# Patient Record
Sex: MALE | Race: WHITE | NOT HISPANIC OR LATINO | Employment: OTHER | URBAN - METROPOLITAN AREA
[De-identification: names, ages, dates, MRNs, and addresses within clinical notes are randomized per-mention and may not be internally consistent; named-entity substitution may affect disease eponyms.]

---

## 2024-05-13 ENCOUNTER — APPOINTMENT (OUTPATIENT)
Dept: CARDIOLOGY | Facility: HOSPITAL | Age: 84
DRG: 153 | End: 2024-05-13
Payer: MEDICARE

## 2024-05-13 ENCOUNTER — HOSPITAL ENCOUNTER (INPATIENT)
Facility: HOSPITAL | Age: 84
LOS: 4 days | Discharge: HOME HEALTH | DRG: 153 | End: 2024-05-17
Attending: STUDENT IN AN ORGANIZED HEALTH CARE EDUCATION/TRAINING PROGRAM | Admitting: INTERNAL MEDICINE
Payer: MEDICARE

## 2024-05-13 ENCOUNTER — APPOINTMENT (OUTPATIENT)
Dept: RADIOLOGY | Facility: HOSPITAL | Age: 84
DRG: 153 | End: 2024-05-13
Payer: MEDICARE

## 2024-05-13 DIAGNOSIS — I67.89 VERTIGO DUE TO ACUTE CEREBROVASCULAR DISEASE: ICD-10-CM

## 2024-05-13 DIAGNOSIS — R42 VERTIGO DUE TO ACUTE CEREBROVASCULAR DISEASE: ICD-10-CM

## 2024-05-13 DIAGNOSIS — I65.21 STENOSIS OF RIGHT CAROTID ARTERY: ICD-10-CM

## 2024-05-13 DIAGNOSIS — G45.8 OTHER TRANSIENT CEREBRAL ISCHEMIC ATTACKS AND RELATED SYNDROMES: ICD-10-CM

## 2024-05-13 DIAGNOSIS — R42 DIZZINESS: Primary | ICD-10-CM

## 2024-05-13 LAB
ALBUMIN SERPL-MCNC: 4.2 G/DL (ref 3.5–5)
ALP BLD-CCNC: 70 U/L (ref 35–125)
ALT SERPL-CCNC: 22 U/L (ref 5–40)
ANION GAP SERPL CALC-SCNC: 14 MMOL/L
APPEARANCE UR: CLEAR
AST SERPL-CCNC: 26 U/L (ref 5–40)
BASOPHILS # BLD AUTO: 0.07 X10*3/UL (ref 0–0.1)
BASOPHILS NFR BLD AUTO: 0.8 %
BILIRUB SERPL-MCNC: 0.6 MG/DL (ref 0.1–1.2)
BILIRUB UR STRIP.AUTO-MCNC: NEGATIVE MG/DL
BUN SERPL-MCNC: 23 MG/DL (ref 8–25)
CALCIUM SERPL-MCNC: 9.2 MG/DL (ref 8.5–10.4)
CHLORIDE SERPL-SCNC: 106 MMOL/L (ref 97–107)
CO2 SERPL-SCNC: 21 MMOL/L (ref 24–31)
COLOR UR: NORMAL
CREAT SERPL-MCNC: 1 MG/DL (ref 0.4–1.6)
EGFRCR SERPLBLD CKD-EPI 2021: 75 ML/MIN/1.73M*2
EOSINOPHIL # BLD AUTO: 0.21 X10*3/UL (ref 0–0.4)
EOSINOPHIL NFR BLD AUTO: 2.4 %
ERYTHROCYTE [DISTWIDTH] IN BLOOD BY AUTOMATED COUNT: 12.8 % (ref 11.5–14.5)
GLUCOSE BLD MANUAL STRIP-MCNC: 94 MG/DL (ref 74–99)
GLUCOSE SERPL-MCNC: 167 MG/DL (ref 65–99)
GLUCOSE UR STRIP.AUTO-MCNC: NORMAL MG/DL
HCT VFR BLD AUTO: 39.6 % (ref 41–52)
HGB BLD-MCNC: 13.1 G/DL (ref 13.5–17.5)
HOLD SPECIMEN: NORMAL
IMM GRANULOCYTES # BLD AUTO: 0.02 X10*3/UL (ref 0–0.5)
IMM GRANULOCYTES NFR BLD AUTO: 0.2 % (ref 0–0.9)
KETONES UR STRIP.AUTO-MCNC: NEGATIVE MG/DL
LEUKOCYTE ESTERASE UR QL STRIP.AUTO: NEGATIVE
LYMPHOCYTES # BLD AUTO: 2.14 X10*3/UL (ref 0.8–3)
LYMPHOCYTES NFR BLD AUTO: 24.5 %
MAGNESIUM SERPL-MCNC: 2.2 MG/DL (ref 1.6–3.1)
MCH RBC QN AUTO: 32.2 PG (ref 26–34)
MCHC RBC AUTO-ENTMCNC: 33.1 G/DL (ref 32–36)
MCV RBC AUTO: 97 FL (ref 80–100)
MONOCYTES # BLD AUTO: 0.74 X10*3/UL (ref 0.05–0.8)
MONOCYTES NFR BLD AUTO: 8.5 %
NEUTROPHILS # BLD AUTO: 5.57 X10*3/UL (ref 1.6–5.5)
NEUTROPHILS NFR BLD AUTO: 63.6 %
NITRITE UR QL STRIP.AUTO: NEGATIVE
NRBC BLD-RTO: 0 /100 WBCS (ref 0–0)
PH UR STRIP.AUTO: 5.5 [PH]
PLATELET # BLD AUTO: 232 X10*3/UL (ref 150–450)
POTASSIUM SERPL-SCNC: 4.3 MMOL/L (ref 3.4–5.1)
PROT SERPL-MCNC: 7.3 G/DL (ref 5.9–7.9)
PROT UR STRIP.AUTO-MCNC: NEGATIVE MG/DL
RBC # BLD AUTO: 4.07 X10*6/UL (ref 4.5–5.9)
RBC # UR STRIP.AUTO: NEGATIVE /UL
SODIUM SERPL-SCNC: 141 MMOL/L (ref 133–145)
SP GR UR STRIP.AUTO: 1.02
TROPONIN T SERPL-MCNC: 21 NG/L
TROPONIN T SERPL-MCNC: 23 NG/L
UROBILINOGEN UR STRIP.AUTO-MCNC: NORMAL MG/DL
WBC # BLD AUTO: 8.8 X10*3/UL (ref 4.4–11.3)

## 2024-05-13 PROCEDURE — 70544 MR ANGIOGRAPHY HEAD W/O DYE: CPT

## 2024-05-13 PROCEDURE — 70551 MRI BRAIN STEM W/O DYE: CPT

## 2024-05-13 PROCEDURE — 81003 URINALYSIS AUTO W/O SCOPE: CPT

## 2024-05-13 PROCEDURE — 70544 MR ANGIOGRAPHY HEAD W/O DYE: CPT | Performed by: STUDENT IN AN ORGANIZED HEALTH CARE EDUCATION/TRAINING PROGRAM

## 2024-05-13 PROCEDURE — 82947 ASSAY GLUCOSE BLOOD QUANT: CPT

## 2024-05-13 PROCEDURE — 85025 COMPLETE CBC W/AUTO DIFF WBC: CPT | Performed by: STUDENT IN AN ORGANIZED HEALTH CARE EDUCATION/TRAINING PROGRAM

## 2024-05-13 PROCEDURE — 93005 ELECTROCARDIOGRAM TRACING: CPT

## 2024-05-13 PROCEDURE — 2500000001 HC RX 250 WO HCPCS SELF ADMINISTERED DRUGS (ALT 637 FOR MEDICARE OP)

## 2024-05-13 PROCEDURE — 99285 EMERGENCY DEPT VISIT HI MDM: CPT | Mod: 25

## 2024-05-13 PROCEDURE — 2500000004 HC RX 250 GENERAL PHARMACY W/ HCPCS (ALT 636 FOR OP/ED)

## 2024-05-13 PROCEDURE — 70450 CT HEAD/BRAIN W/O DYE: CPT

## 2024-05-13 PROCEDURE — 96374 THER/PROPH/DIAG INJ IV PUSH: CPT

## 2024-05-13 PROCEDURE — 70547 MR ANGIOGRAPHY NECK W/O DYE: CPT | Performed by: STUDENT IN AN ORGANIZED HEALTH CARE EDUCATION/TRAINING PROGRAM

## 2024-05-13 PROCEDURE — 83735 ASSAY OF MAGNESIUM: CPT | Performed by: STUDENT IN AN ORGANIZED HEALTH CARE EDUCATION/TRAINING PROGRAM

## 2024-05-13 PROCEDURE — 2060000001 HC INTERMEDIATE ICU ROOM DAILY

## 2024-05-13 PROCEDURE — 84484 ASSAY OF TROPONIN QUANT: CPT | Performed by: STUDENT IN AN ORGANIZED HEALTH CARE EDUCATION/TRAINING PROGRAM

## 2024-05-13 PROCEDURE — 70547 MR ANGIOGRAPHY NECK W/O DYE: CPT

## 2024-05-13 PROCEDURE — 2500000004 HC RX 250 GENERAL PHARMACY W/ HCPCS (ALT 636 FOR OP/ED): Performed by: NURSE PRACTITIONER

## 2024-05-13 PROCEDURE — 70551 MRI BRAIN STEM W/O DYE: CPT | Performed by: STUDENT IN AN ORGANIZED HEALTH CARE EDUCATION/TRAINING PROGRAM

## 2024-05-13 PROCEDURE — 36415 COLL VENOUS BLD VENIPUNCTURE: CPT | Performed by: STUDENT IN AN ORGANIZED HEALTH CARE EDUCATION/TRAINING PROGRAM

## 2024-05-13 PROCEDURE — 96361 HYDRATE IV INFUSION ADD-ON: CPT

## 2024-05-13 PROCEDURE — 84075 ASSAY ALKALINE PHOSPHATASE: CPT | Performed by: STUDENT IN AN ORGANIZED HEALTH CARE EDUCATION/TRAINING PROGRAM

## 2024-05-13 PROCEDURE — 2500000001 HC RX 250 WO HCPCS SELF ADMINISTERED DRUGS (ALT 637 FOR MEDICARE OP): Performed by: NURSE PRACTITIONER

## 2024-05-13 PROCEDURE — 70450 CT HEAD/BRAIN W/O DYE: CPT | Performed by: RADIOLOGY

## 2024-05-13 RX ORDER — ASPIRIN 81 MG/1
81 TABLET ORAL DAILY
Status: ON HOLD | COMMUNITY
End: 2024-05-13 | Stop reason: WASHOUT

## 2024-05-13 RX ORDER — ONDANSETRON HYDROCHLORIDE 2 MG/ML
4 INJECTION, SOLUTION INTRAVENOUS EVERY 8 HOURS PRN
Status: DISCONTINUED | OUTPATIENT
Start: 2024-05-13 | End: 2024-05-17 | Stop reason: HOSPADM

## 2024-05-13 RX ORDER — ACETAMINOPHEN 650 MG/1
650 SUPPOSITORY RECTAL EVERY 4 HOURS PRN
Status: DISCONTINUED | OUTPATIENT
Start: 2024-05-13 | End: 2024-05-17 | Stop reason: HOSPADM

## 2024-05-13 RX ORDER — ACETAMINOPHEN 160 MG/5ML
650 SOLUTION ORAL EVERY 4 HOURS PRN
Status: DISCONTINUED | OUTPATIENT
Start: 2024-05-13 | End: 2024-05-17 | Stop reason: HOSPADM

## 2024-05-13 RX ORDER — MECLIZINE HYDROCHLORIDE 25 MG/1
25 TABLET ORAL ONCE
Status: COMPLETED | OUTPATIENT
Start: 2024-05-13 | End: 2024-05-13

## 2024-05-13 RX ORDER — FLUTICASONE PROPIONATE 50 MCG
2 SPRAY, SUSPENSION (ML) NASAL DAILY
COMMUNITY

## 2024-05-13 RX ORDER — CEPHALEXIN 500 MG/1
500 CAPSULE ORAL 2 TIMES DAILY
COMMUNITY

## 2024-05-13 RX ORDER — BISMUTH SUBSALICYLATE 262 MG
1 TABLET,CHEWABLE ORAL DAILY
COMMUNITY

## 2024-05-13 RX ORDER — OXYCODONE AND ACETAMINOPHEN 5; 325 MG/1; MG/1
1 TABLET ORAL EVERY 12 HOURS PRN
COMMUNITY
Start: 2024-05-06 | End: 2024-06-05

## 2024-05-13 RX ORDER — AZELASTINE HYDROCHLORIDE 0.5 MG/ML
1 SOLUTION/ DROPS OPHTHALMIC 2 TIMES DAILY
COMMUNITY

## 2024-05-13 RX ORDER — ENOXAPARIN SODIUM 100 MG/ML
40 INJECTION SUBCUTANEOUS DAILY
Status: DISCONTINUED | OUTPATIENT
Start: 2024-05-13 | End: 2024-05-17 | Stop reason: HOSPADM

## 2024-05-13 RX ORDER — ATORVASTATIN CALCIUM 40 MG/1
40 TABLET, FILM COATED ORAL NIGHTLY
Status: DISCONTINUED | OUTPATIENT
Start: 2024-05-13 | End: 2024-05-17 | Stop reason: HOSPADM

## 2024-05-13 RX ORDER — OXYCODONE AND ACETAMINOPHEN 5; 325 MG/1; MG/1
1 TABLET ORAL EVERY 12 HOURS PRN
Status: DISCONTINUED | OUTPATIENT
Start: 2024-05-13 | End: 2024-05-17 | Stop reason: HOSPADM

## 2024-05-13 RX ORDER — MAGNESIUM HYDROXIDE 2400 MG/10ML
10 SUSPENSION ORAL DAILY PRN
Status: DISCONTINUED | OUTPATIENT
Start: 2024-05-13 | End: 2024-05-17 | Stop reason: HOSPADM

## 2024-05-13 RX ORDER — GABAPENTIN 300 MG/1
600 CAPSULE ORAL NIGHTLY
COMMUNITY

## 2024-05-13 RX ORDER — FLUTICASONE PROPIONATE 50 MCG
2 SPRAY, SUSPENSION (ML) NASAL DAILY
Status: DISCONTINUED | OUTPATIENT
Start: 2024-05-13 | End: 2024-05-17 | Stop reason: HOSPADM

## 2024-05-13 RX ORDER — ASPIRIN 325 MG
325 TABLET, DELAYED RELEASE (ENTERIC COATED) ORAL DAILY
Status: DISCONTINUED | OUTPATIENT
Start: 2024-05-14 | End: 2024-05-17 | Stop reason: HOSPADM

## 2024-05-13 RX ORDER — ONDANSETRON 4 MG/1
4 TABLET, FILM COATED ORAL EVERY 8 HOURS PRN
Status: DISCONTINUED | OUTPATIENT
Start: 2024-05-13 | End: 2024-05-17 | Stop reason: HOSPADM

## 2024-05-13 RX ORDER — ASPIRIN 81 MG/1
81 TABLET ORAL DAILY
Status: DISCONTINUED | OUTPATIENT
Start: 2024-05-13 | End: 2024-05-13

## 2024-05-13 RX ORDER — DOCUSATE SODIUM 100 MG/1
100 CAPSULE, LIQUID FILLED ORAL 2 TIMES DAILY
COMMUNITY

## 2024-05-13 RX ORDER — HYDRALAZINE HYDROCHLORIDE 25 MG/1
25 TABLET, FILM COATED ORAL EVERY 6 HOURS PRN
Status: DISCONTINUED | OUTPATIENT
Start: 2024-05-16 | End: 2024-05-17 | Stop reason: HOSPADM

## 2024-05-13 RX ORDER — ROSUVASTATIN CALCIUM 20 MG/1
20 TABLET, COATED ORAL DAILY
COMMUNITY

## 2024-05-13 RX ORDER — AMLODIPINE AND OLMESARTAN MEDOXOMIL 10; 40 MG/1; MG/1
1 TABLET ORAL DAILY
COMMUNITY

## 2024-05-13 RX ORDER — GABAPENTIN 300 MG/1
300 CAPSULE ORAL DAILY
Status: DISCONTINUED | OUTPATIENT
Start: 2024-05-14 | End: 2024-05-17 | Stop reason: HOSPADM

## 2024-05-13 RX ORDER — BISMUTH SUBSALICYLATE 262 MG
1 TABLET,CHEWABLE ORAL DAILY
Status: DISCONTINUED | OUTPATIENT
Start: 2024-05-13 | End: 2024-05-17 | Stop reason: HOSPADM

## 2024-05-13 RX ORDER — ONDANSETRON HYDROCHLORIDE 2 MG/ML
4 INJECTION, SOLUTION INTRAVENOUS ONCE
Status: COMPLETED | OUTPATIENT
Start: 2024-05-13 | End: 2024-05-13

## 2024-05-13 RX ORDER — GABAPENTIN 300 MG/1
300 CAPSULE ORAL DAILY
COMMUNITY

## 2024-05-13 RX ORDER — ASPIRIN 325 MG
325 TABLET ORAL DAILY
COMMUNITY

## 2024-05-13 RX ORDER — HYDRALAZINE HYDROCHLORIDE 20 MG/ML
5 INJECTION INTRAMUSCULAR; INTRAVENOUS EVERY 4 HOURS PRN
Status: ACTIVE | OUTPATIENT
Start: 2024-05-13 | End: 2024-05-16

## 2024-05-13 RX ORDER — GABAPENTIN 600 MG/1
600 TABLET ORAL NIGHTLY
Status: DISCONTINUED | OUTPATIENT
Start: 2024-05-13 | End: 2024-05-17 | Stop reason: HOSPADM

## 2024-05-13 RX ORDER — ACETAMINOPHEN 325 MG/1
650 TABLET ORAL EVERY 4 HOURS PRN
Status: DISCONTINUED | OUTPATIENT
Start: 2024-05-13 | End: 2024-05-17 | Stop reason: HOSPADM

## 2024-05-13 RX ORDER — KETOTIFEN FUMARATE 0.35 MG/ML
1 SOLUTION/ DROPS OPHTHALMIC 2 TIMES DAILY
Status: DISCONTINUED | OUTPATIENT
Start: 2024-05-13 | End: 2024-05-17 | Stop reason: HOSPADM

## 2024-05-13 RX ORDER — DOCUSATE SODIUM 100 MG/1
100 CAPSULE, LIQUID FILLED ORAL 2 TIMES DAILY
Status: DISCONTINUED | OUTPATIENT
Start: 2024-05-13 | End: 2024-05-17 | Stop reason: HOSPADM

## 2024-05-13 RX ORDER — KETOCONAZOLE 20 MG/G
CREAM TOPICAL 2 TIMES DAILY
COMMUNITY

## 2024-05-13 RX ORDER — CEPHALEXIN 500 MG/1
500 CAPSULE ORAL 2 TIMES DAILY
Status: DISCONTINUED | OUTPATIENT
Start: 2024-05-13 | End: 2024-05-17 | Stop reason: HOSPADM

## 2024-05-13 RX ADMIN — CEPHALEXIN 500 MG: 500 CAPSULE ORAL at 20:52

## 2024-05-13 RX ADMIN — GABAPENTIN 600 MG: 600 TABLET, FILM COATED ORAL at 20:51

## 2024-05-13 RX ADMIN — SODIUM CHLORIDE 1000 ML: 900 INJECTION, SOLUTION INTRAVENOUS at 12:11

## 2024-05-13 RX ADMIN — SODIUM CHLORIDE 1000 ML: 900 INJECTION, SOLUTION INTRAVENOUS at 08:38

## 2024-05-13 RX ADMIN — OXYCODONE AND ACETAMINOPHEN 1 TABLET: 5; 325 TABLET ORAL at 20:51

## 2024-05-13 RX ADMIN — ENOXAPARIN SODIUM 40 MG: 40 INJECTION SUBCUTANEOUS at 19:09

## 2024-05-13 RX ADMIN — MECLIZINE HYDROCHLORIDE 25 MG: 25 TABLET ORAL at 08:43

## 2024-05-13 RX ADMIN — MECLIZINE HYDROCHLORIDE 25 MG: 25 TABLET ORAL at 12:11

## 2024-05-13 RX ADMIN — DOCUSATE SODIUM 100 MG: 100 CAPSULE, LIQUID FILLED ORAL at 20:52

## 2024-05-13 RX ADMIN — ATORVASTATIN CALCIUM 40 MG: 40 TABLET, FILM COATED ORAL at 20:52

## 2024-05-13 RX ADMIN — KETOTIFEN FUMARATE 1 DROP: 0.25 SOLUTION/ DROPS OPHTHALMIC at 22:47

## 2024-05-13 RX ADMIN — ONDANSETRON 4 MG: 2 INJECTION INTRAMUSCULAR; INTRAVENOUS at 08:44

## 2024-05-13 SDOH — SOCIAL STABILITY: SOCIAL INSECURITY: ARE THERE ANY APPARENT SIGNS OF INJURIES/BEHAVIORS THAT COULD BE RELATED TO ABUSE/NEGLECT?: NO

## 2024-05-13 SDOH — SOCIAL STABILITY: SOCIAL INSECURITY: DOES ANYONE TRY TO KEEP YOU FROM HAVING/CONTACTING OTHER FRIENDS OR DOING THINGS OUTSIDE YOUR HOME?: NO

## 2024-05-13 SDOH — SOCIAL STABILITY: SOCIAL INSECURITY: ABUSE: ADULT

## 2024-05-13 SDOH — SOCIAL STABILITY: SOCIAL INSECURITY: HAVE YOU HAD THOUGHTS OF HARMING ANYONE ELSE?: NO

## 2024-05-13 SDOH — SOCIAL STABILITY: SOCIAL INSECURITY: ARE YOU OR HAVE YOU BEEN THREATENED OR ABUSED PHYSICALLY, EMOTIONALLY, OR SEXUALLY BY ANYONE?: NO

## 2024-05-13 SDOH — SOCIAL STABILITY: SOCIAL INSECURITY: HAVE YOU HAD ANY THOUGHTS OF HARMING ANYONE ELSE?: NO

## 2024-05-13 SDOH — SOCIAL STABILITY: SOCIAL INSECURITY: HAS ANYONE EVER THREATENED TO HURT YOUR FAMILY OR YOUR PETS?: NO

## 2024-05-13 SDOH — SOCIAL STABILITY: SOCIAL INSECURITY: DO YOU FEEL ANYONE HAS EXPLOITED OR TAKEN ADVANTAGE OF YOU FINANCIALLY OR OF YOUR PERSONAL PROPERTY?: NO

## 2024-05-13 SDOH — SOCIAL STABILITY: SOCIAL INSECURITY: WERE YOU ABLE TO COMPLETE ALL THE BEHAVIORAL HEALTH SCREENINGS?: YES

## 2024-05-13 SDOH — SOCIAL STABILITY: SOCIAL INSECURITY: DO YOU FEEL UNSAFE GOING BACK TO THE PLACE WHERE YOU ARE LIVING?: NO

## 2024-05-13 ASSESSMENT — COGNITIVE AND FUNCTIONAL STATUS - GENERAL
STANDING UP FROM CHAIR USING ARMS: A LOT
HELP NEEDED FOR BATHING: A LOT
PATIENT BASELINE BEDBOUND: NO
DAILY ACTIVITIY SCORE: 16
TOILETING: A LITTLE
MOVING FROM LYING ON BACK TO SITTING ON SIDE OF FLAT BED WITH BEDRAILS: A LITTLE
MOVING TO AND FROM BED TO CHAIR: A LOT
DRESSING REGULAR UPPER BODY CLOTHING: A LOT
CLIMB 3 TO 5 STEPS WITH RAILING: A LOT
PERSONAL GROOMING: A LITTLE
WALKING IN HOSPITAL ROOM: A LOT
TURNING FROM BACK TO SIDE WHILE IN FLAT BAD: A LITTLE
MOBILITY SCORE: 14
DRESSING REGULAR LOWER BODY CLOTHING: A LOT

## 2024-05-13 ASSESSMENT — ACTIVITIES OF DAILY LIVING (ADL)
BATHING: INDEPENDENT
DRESSING YOURSELF: INDEPENDENT
GROOMING: INDEPENDENT
TOILETING: INDEPENDENT
PATIENT'S MEMORY ADEQUATE TO SAFELY COMPLETE DAILY ACTIVITIES?: YES
LACK_OF_TRANSPORTATION: NO
ADEQUATE_TO_COMPLETE_ADL: YES
WALKS IN HOME: INDEPENDENT
FEEDING YOURSELF: INDEPENDENT
HEARING - RIGHT EAR: DIFFICULTY WITH NOISE
JUDGMENT_ADEQUATE_SAFELY_COMPLETE_DAILY_ACTIVITIES: YES
HEARING - LEFT EAR: DIFFICULTY WITH NOISE

## 2024-05-13 ASSESSMENT — ENCOUNTER SYMPTOMS
FEVER: 0
ABDOMINAL DISTENTION: 0
DIZZINESS: 1
CHILLS: 1
CHEST TIGHTNESS: 0
ABDOMINAL PAIN: 0
LIGHT-HEADEDNESS: 1
VOMITING: 0
FATIGUE: 0
NUMBNESS: 0
APPETITE CHANGE: 0
DIARRHEA: 0
SHORTNESS OF BREATH: 0
PALPITATIONS: 0
NAUSEA: 0
CONSTIPATION: 0
DYSURIA: 0
RHINORRHEA: 0
HEADACHES: 1
COUGH: 1

## 2024-05-13 ASSESSMENT — LIFESTYLE VARIABLES
HOW MANY STANDARD DRINKS CONTAINING ALCOHOL DO YOU HAVE ON A TYPICAL DAY: PATIENT DOES NOT DRINK
SKIP TO QUESTIONS 9-10: 1
AUDIT-C TOTAL SCORE: 0
AUDIT-C TOTAL SCORE: 0
SUBSTANCE_ABUSE_PAST_12_MONTHS: NO
PRESCIPTION_ABUSE_PAST_12_MONTHS: NO
HOW OFTEN DO YOU HAVE A DRINK CONTAINING ALCOHOL: NEVER
HOW OFTEN DO YOU HAVE 6 OR MORE DRINKS ON ONE OCCASION: NEVER

## 2024-05-13 ASSESSMENT — COLUMBIA-SUICIDE SEVERITY RATING SCALE - C-SSRS
6. HAVE YOU EVER DONE ANYTHING, STARTED TO DO ANYTHING, OR PREPARED TO DO ANYTHING TO END YOUR LIFE?: NO
2. HAVE YOU ACTUALLY HAD ANY THOUGHTS OF KILLING YOURSELF?: NO
1. IN THE PAST MONTH, HAVE YOU WISHED YOU WERE DEAD OR WISHED YOU COULD GO TO SLEEP AND NOT WAKE UP?: NO

## 2024-05-13 ASSESSMENT — PAIN - FUNCTIONAL ASSESSMENT
PAIN_FUNCTIONAL_ASSESSMENT: 0-10

## 2024-05-13 ASSESSMENT — PATIENT HEALTH QUESTIONNAIRE - PHQ9
SUM OF ALL RESPONSES TO PHQ9 QUESTIONS 1 & 2: 0
1. LITTLE INTEREST OR PLEASURE IN DOING THINGS: NOT AT ALL
2. FEELING DOWN, DEPRESSED OR HOPELESS: NOT AT ALL

## 2024-05-13 ASSESSMENT — PAIN DESCRIPTION - LOCATION: LOCATION: BACK

## 2024-05-13 ASSESSMENT — PAIN SCALES - GENERAL
PAINLEVEL_OUTOF10: 0 - NO PAIN
PAINLEVEL_OUTOF10: 2
PAINLEVEL_OUTOF10: 4
PAINLEVEL_OUTOF10: 0 - NO PAIN

## 2024-05-13 ASSESSMENT — VISUAL ACUITY: VA_NORMAL: 1

## 2024-05-13 NOTE — ED TRIAGE NOTES
Pt states that he felt like he was gonna pass out yesterday. Pt states that he feels like the room has been spinning. Dizziness has been going on for about a few months. Dizziness worse today.

## 2024-05-13 NOTE — PROGRESS NOTES
Pharmacy Medication History Review    Yanick Kruse is a 83 y.o. male admitted for Dizziness. Pharmacy reviewed the patient's [id number] medications and allergies for accuracy.    Medications ADDED:  ALL  Medications CHANGED:  none  Medications REMOVED:   none     The list below reflects the updated PTA list. Comments regarding how patient may be taking medications differently can be found in the Admit Orders Activity  Prior to Admission Medications   Prescriptions Last Dose Informant   SAW PALMETTO ORAL 5/12/2024 Self,spouse   Sig: Take by mouth once daily.   amlodipine-olmesartan (Alycia) 10-40 mg tablet 5/12/2024 Self,spouse   Sig: Take 1 tablet by mouth once daily.   aspirin 81 mg EC tablet 5/12/2024 Self,spouse   Sig: Take 1 tablet (81 mg) by mouth once daily.   azelastine (Optivar) 0.05 % ophthalmic solution 5/12/2024 Self,spouse   Sig: Administer 1 drop into both eyes 2 times a day.   cephalexin (Keflex) 500 mg capsule 5/12/2024 Self,spouse   Sig: Take 1 capsule (500 mg) by mouth 2 times a day.   docusate sodium (Colace) 100 mg capsule 5/12/2024 Self,spouse   Sig: Take 1 capsule (100 mg) by mouth 2 times a day.   fluticasone (Flonase) 50 mcg/actuation nasal spray 5/12/2024 Self,spouse   Sig: Administer 2 sprays into each nostril once daily. Shake gently. Before first use, prime pump. After use, clean tip and replace cap.   gabapentin (Neurontin) 300 mg capsule 5/12/2024 Self,spouse   Sig: Take 2 capsules (600 mg) by mouth once daily at bedtime.   gabapentin (Neurontin) 300 mg capsule 5/12/2024 Self,spouse   Sig: Take 1 capsule (300 mg) by mouth once daily.   ketoconazole (NIZOral) 2 % cream 5/12/2024 Self,spouse   Sig: Apply topically 2 times a day.   multivitamin tablet 5/12/2024 Self,spouse   Sig: Take 1 tablet by mouth once daily.   rosuvastatin (Crestor) 20 mg tablet 5/12/2024 Self,spouse   Sig: Take 1 tablet (20 mg) by mouth once daily.      Facility-Administered Medications: None        The  list below reflects the updated allergy list. Please review each documented allergy for additional clarification and justification.  Allergies  Reviewed by Chandrika Bhagat on 5/13/2024        Severity Reactions Comments    Penicillins High Anaphylaxis     Codeine Medium Hallucinations     Naproxen Medium GI Upset     Sulfa (sulfonamide Antibiotics) Not Specified Unknown     Vardenafil Not Specified Confusion             Pharmacy has been updated to [facility].    Sources used to complete the med history include historical AVS, PTA medication list, patient/spouse interview. Informant is a good historian.    Below are additional concerns with the patient's PTA list.  none    Chandrika Bhagat  Please reach out via [employer] Secure Chat for questions

## 2024-05-13 NOTE — ED PROVIDER NOTES
Supervisory note:  Patient seen in conjunction with GRAYSON Nunes.    Patient presents with dizziness.  He reports that he was having difficulty even getting around.  EMS arrived to help him down the stairs of his house and with EMS assistance, he was able to walk.  He reports he became nauseated during this episode.  As long as he lays flat, he feels better.  If he is up and moving around, he becomes extremely dizzy.  On examination, patient is awake, alert, answers questions appropriately.  Vital signs notable for elevated blood pressure.  Neurological examination without acute findings.    Laboratory studies unremarkable.  CAT scan without acute findings.  Despite receiving doses of meclizine, patient continues to be extremely dizzy.  Patient accepted to hospitalist service for further management.  I personally saw the patient and made/approved the management plan and take responsiblity for the patient management.  Parts of this chart were completed with dictation software, please excuse any errors in transcription.     Navi Miles MD  05/13/24 [id number]

## 2024-05-13 NOTE — CONSULTS
Inpatient consult to Neurology  Consult performed by: Moose Moy MD  Consult ordered by: CONSTANTINE Ken-CNP        History Of Present Illness  Yanick Kruse is a 83 y.o. male presenting with history of HTN, HLD, right carotid stenosis, lumbar DJD status post multiple surgeries, and chronic back pain who presented to ED on 5/13/2024 with complaint of dizziness.  Over the last several months he has been experiencing headaches attributed to sinus disease.  He states that in March she noticed onset of sinus pressure and sinus type headache that was treated with several courses of antibiotics.  Unfortunately this did not resolve.  He was treated with a Z-Clark for sinus infection several weeks ago but continues to have symptoms.  There was associated with dizziness described as a lightheaded sensation without vertigo.  On April 12 he did have a syncopal episode per his report for unclear reasons associated with a fall but no significant injuries reported.  Patient also complaining of feeling weaker and dropping objects more recently symptoms worsened.  He is not complaining of his left hand is feeling weaker, which has been present for several months and getting worse as well.  Patient has noticed increased hand weakness in association with dizzy spells.  Significant worsening of symptoms over the last 24 hours with episode today of dizziness and near syncope on the commode.  No vertigo associated.  No loss of consciousness.  After period of rest she was able to continue with his activities.  Subsequent random onset dizziness again morning with near syncope prompting presentation to the ED.   The patient subsequently presented to the emergency room for further evaluation given the worsening symptoms.  Denied any other associated symptoms.  There is no recent fever, chills, cough, shortness of breath, palpitations, chest pain, abdominal pain, nausea, vomiting, chest pain, nominal pain, paresthesias,  bowel/bladder dysfunction, or behavior/cognitive change.        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[facility])     Spinal stenosis     Unspecified hearing loss, bilateral     Hearing loss of both ears     Surgical History  Past Surgical History:   Procedure Laterality Date    LUMBAR LAMINECTOMY  06/24/2014    Laminectomy Lumbar    MR HEAD ANGIO WO IV CONTRAST  10/27/2022    MR HEAD ANGIO WO IV CONTRAST [facility]    SPINAL FUSION       Social History  Social History     Tobacco Use    Smoking status: Former   Substance Use Topics    Alcohol use: Never    Drug use: Never     Allergies  Penicillins, Codeine, Naproxen, Sulfa (sulfonamide antibiotics), and Vardenafil  Medications Prior to Admission   Medication Sig Dispense Refill Last Dose    amlodipine-olmesartan (Alycia) 10-40 mg tablet Take 1 tablet by mouth once daily.   5/12/2024    aspirin 81 mg EC tablet Take 1 tablet (81 mg) by mouth once daily.   5/12/2024    azelastine (Optivar) 0.05 % ophthalmic solution Administer 1 drop into both eyes 2 times a day.   5/12/2024    cephalexin (Keflex) 500 mg capsule Take 1 capsule (500 mg) by mouth 2 times a day.   5/12/2024    docusate sodium (Colace) 100 mg capsule Take 1 capsule (100 mg) by mouth 2 times a day.   5/12/2024    fluticasone (Flonase) 50 mcg/actuation nasal spray Administer 2 sprays into each nostril once daily. Shake gently. Before first use, prime pump. After use, clean tip and replace cap.   5/12/2024    gabapentin (Neurontin) 300 mg capsule Take 2 capsules (600 mg) by mouth once daily at bedtime.   5/12/2024    gabapentin (Neurontin) 300 mg capsule Take 1 capsule (300 mg) by mouth once daily.   5/12/2024    ketoconazole (NIZOral) 2 % cream Apply topically 2 times a day.   5/12/2024    multivitamin tablet Take 1 tablet by mouth once daily.    5/12/2024    oxyCODONE-acetaminophen (Percocet) 5-325 mg tablet Take 1 tablet by mouth every 12 hours if needed for severe pain (7 - 10).       rosuvastatin (Crestor) 20 mg tablet Take 1 tablet (20 mg) by mouth once daily.   5/12/2024    SAW PALMETTO ORAL Take by mouth once daily.   5/12/2024     Current Outpatient Medications   Medication Instructions    amlodipine-olmesartan (Alycia) 10-40 mg tablet 1 tablet, oral, Daily    aspirin 81 mg, oral, Daily    azelastine (Optivar) 0.05 % ophthalmic solution 1 drop, Both Eyes, 2 times daily    cephalexin (KEFLEX) 500 mg, oral, 2 times daily    docusate sodium (COLACE) 100 mg, oral, 2 times daily    fluticasone (Flonase) 50 mcg/actuation nasal spray 2 sprays, Each Nostril, Daily, Shake gently. Before first use, prime pump. After use, clean tip and replace cap.    gabapentin (NEURONTIN) 600 mg, oral, Nightly    gabapentin (NEURONTIN) 300 mg, oral, Daily    ketoconazole (NIZOral) 2 % cream Topical, 2 times daily    multivitamin tablet 1 tablet, oral, Daily    oxyCODONE-acetaminophen (Percocet) 5-325 mg tablet 1 tablet, oral, Every 12 hours PRN    rosuvastatin (CRESTOR) 20 mg, oral, Daily    SAW PALMETTO ORAL oral, Daily       Review of Systems  Neurological Exam  Mental Status  Awake and alert. Oriented to person, place, time and situation. Recent and remote memory are intact. Speech is normal. Language is fluent with no aphasia. Attention and concentration are normal.    Cranial Nerves  CN II: Visual acuity is normal. Visual fields full to confrontation.  CN III, IV, VI: Extraocular movements intact bilaterally. Normal lids and orbits bilaterally. Pupils equal round and reactive to light bilaterally.  CN V:  Right: Facial sensation is normal. Jaw strength is normal.  Left: Facial sensation is normal on the left. Jaw strength is normal.  CN VII:  Right: There is no facial weakness. Taste is normal.  Left: There is no facial weakness. Taste is normal.  CN VIII:  Right: Hearing  "is normal.  Left: Hearing is normal.  CN IX, X:  Right: Palate is normal. Gag is intact.  Left: Palate is normal. Gag is intact.  CN XI: Shoulder shrug strength is normal.  CN XII: Tongue midline without atrophy or fasciculations.    Motor  Normal muscle bulk throughout. No fasciculations present. Normal muscle tone. No abnormal involuntary movements. Strength is 5/5 throughout all four extremities.    Sensory  Light touch is normal in upper and lower extremities. Pinprick is normal in upper and lower extremities. Temperature is normal in upper and lower extremities. Vibration is normal in upper and lower extremities.     Reflexes  Deep tendon reflexes are 2+ and symmetric in all four extremities.    Coordination  Right: Finger-to-nose normal. Rapid alternating movement normal. Heel-to-shin normal.Left: Finger-to-nose normal. Rapid alternating movement normal. Heel-to-shin normal.    Gait    Not tested..    Physical Exam  Constitutional:       General: He is awake.      Appearance: Normal appearance. He is well-developed and overweight.   HENT:      Right Ear: Hearing normal.      Left Ear: Hearing normal.   Eyes:      General: Lids are normal.      Extraocular Movements: Extraocular movements intact.      Pupils: Pupils are equal, round, and reactive to light.   Cardiovascular:      Rate and Rhythm: Normal rate and regular rhythm.      Heart sounds: Normal heart sounds.   Pulmonary:      Effort: Pulmonary effort is normal.      Breath sounds: Normal breath sounds.   Musculoskeletal:      Right lower leg: No edema.      Left lower leg: No edema.   Skin:     General: Skin is warm and dry.   Neurological:      Mental Status: He is alert.      Motor: Motor strength is normal.     Deep Tendon Reflexes: Reflexes are normal and symmetric.   Psychiatric:         Speech: Speech normal.       Last Recorded Vitals  Blood pressure 172/87, pulse 86, resp. rate 18, height 1.753 m (5' 9\"), weight 122 kg (270 lb), SpO2 " 96%.    Relevant Results    Lab Results:  Results for orders placed or performed during the hospital encounter of 05/13/24 (from the past 24 hour(s))   Comprehensive metabolic panel   Result Value Ref Range    Glucose 167 (H) 65 - 99 mg/dL    Sodium 141 133 - 145 mmol/L    Potassium 4.3 3.4 - 5.1 mmol/L    Chloride 106 97 - 107 mmol/L    Bicarbonate 21 (L) 24 - 31 mmol/L    Urea Nitrogen 23 8 - 25 mg/dL    Creatinine 1.00 0.40 - 1.60 mg/dL    eGFR 75 >60 mL/min/1.73m*2    Calcium 9.2 8.5 - 10.4 mg/dL    Albumin 4.2 3.5 - 5.0 g/dL    Alkaline Phosphatase 70 35 - 125 U/L    Total Protein 7.3 5.9 - 7.9 g/dL    AST 26 5 - 40 U/L    Bilirubin, Total 0.6 0.1 - 1.2 mg/dL    ALT 22 5 - 40 U/L    Anion Gap 14 <=19 mmol/L   CBC and Auto Differential   Result Value Ref Range    WBC 8.8 4.4 - 11.3 x10*3/uL    nRBC 0.0 0.0 - 0.0 /100 WBCs    RBC 4.07 (L) 4.50 - 5.90 x10*6/uL    Hemoglobin 13.1 (L) 13.5 - 17.5 g/dL    Hematocrit 39.6 (L) 41.0 - 52.0 %    MCV 97 80 - 100 fL    MCH 32.2 26.0 - 34.0 pg    MCHC 33.1 32.0 - 36.0 g/dL    RDW 12.8 11.5 - 14.5 %    Platelets 232 150 - 450 x10*3/uL    Neutrophils % 63.6 40.0 - 80.0 %    Immature Granulocytes %, Automated 0.2 0.0 - 0.9 %    Lymphocytes % 24.5 13.0 - 44.0 %    Monocytes % 8.5 2.0 - 10.0 %    Eosinophils % 2.4 0.0 - 6.0 %    Basophils % 0.8 0.0 - 2.0 %    Neutrophils Absolute 5.57 (H) 1.60 - 5.50 x10*3/uL    Immature Granulocytes Absolute, Automated 0.02 0.00 - 0.50 x10*3/uL    Lymphocytes Absolute 2.14 0.80 - 3.00 x10*3/uL    Monocytes Absolute 0.74 0.05 - 0.80 x10*3/uL    Eosinophils Absolute 0.21 0.00 - 0.40 x10*3/uL    Basophils Absolute 0.07 0.00 - 0.10 x10*3/uL   Magnesium   Result Value Ref Range    Magnesium 2.20 1.60 - 3.10 mg/dL   Serial Troponin, Initial ([facility])   Result Value Ref Range    Troponin T, High Sensitivity 21 (HH) <=14 ng/L   Urinalysis with Reflex Culture and Microscopic   Result Value Ref Range    Color, Urine Light-Yellow Light-Yellow, Yellow,  Dark-Yellow    Appearance, Urine Clear Clear    Specific Gravity, Urine 1.024 1.005 - 1.035    pH, Urine 5.5 5.0, 5.5, 6.0, 6.5, 7.0, 7.5, 8.0    Protein, Urine NEGATIVE NEGATIVE, 10 (TRACE), 20 (TRACE) mg/dL    Glucose, Urine Normal Normal mg/dL    Blood, Urine NEGATIVE NEGATIVE    Ketones, Urine NEGATIVE NEGATIVE mg/dL    Bilirubin, Urine NEGATIVE NEGATIVE    Urobilinogen, Urine Normal Normal mg/dL    Nitrite, Urine NEGATIVE NEGATIVE    Leukocyte Esterase, Urine NEGATIVE NEGATIVE   Troponin T   Result Value Ref Range    Troponin T, High Sensitivity 23 (HH) <=14 ng/L         I have personally reviewed the following imaging results CT head wo IV contrast    Result Date: 5/13/2024  Interpreted By:  Forest Del Rosario, STUDY: CT HEAD WO IV CONTRAST; 5/13/2024 8:51 am   INDICATION: Signs/Symptoms:dizziness.   COMPARISON: 10/26/2022.   ACCESSION NUMBER(S): [id number]   ORDERING CLINICIAN: KHOI DE LA FUENTE   TECHNIQUE: CT axial images through the Brain were obtained without contrast.   FINDINGS: There is no mass effect, hemorrhage, or infarct. The ventricles are normal.  The visualized paranasal sinuses are clear.       Unremarkable exam.   Signed by: Forest Del Rosario 5/13/2024 9:06 AM Dictation workstation:   [id number]    XR shoulder left 2+ views    Result Date: 5/11/2024  * * *Final Report* * * DATE OF EXAM: May  9 2024  9:52AM   MYX   [id number]  -  XR SHLDR >/=3V AP/BLADIMIR AP/OTHR LT  / ACCESSION #  [id number] PROCEDURE REASON: multiple diagnoses      * * * * Physician Interpretation * * * *  EXAMINATION: Left shoulder HISTORY: Pain TECHNIQUE: 3 views RESULTS: There is spurring along the inferior aspect of the acromion process and at the AC joint.  Glenohumeral joint spaces maintained.   Spurring along the upper outer humeral head.  No acute findings or soft tissue calcifications.    IMPRESSION: Mild degenerative spurring otherwise negative exam : ANTONINA   Transcribe Date/Time: May 11 2024 11:10A Dictated by :  FLORENCIA ZIMMER MD This examination was interpreted and the report reviewed and electronically signed by: FLORENCIA ZIMMER MD on May 11 2024 11:11AM  EST       Assessment/Plan   Principal Problem:    Dizziness      Impression:  1.  Dizziness with lightheaded sensation and no vertigo: Suspect this is most likely due to underlying sinus disease and possibly hypertension given elevated blood pressure on presentation.  2.  Chronic sinus disease with chronic sinus headache  3.  Recent sinus infection and that has been refractory to treatment  4.  Intermittent ataxia associated with dizziness.  5.  History of cardiac murmur but no report of any significant cardiac disease.  6.  History of snoring but sudden daytime sleepiness and cognitive fogging suggesting possible BRIANA given the patient's body habitus and history.       Recommendations:  1.  Echocardiogram to reassess cardiac function  2.  ENT consult refractory bifrontal sinus disease and possible sleep apnea due to presence of Mallampati score of 3-4 and history of snoring  3.  Outpatient sleep study for evaluation of sleep apnea  4.  Further pending ENT consultation.  5.  Medical management of sinus congestion to help with reduction in headache symptoms and dizziness.  6.  Further follow-up      I spent 58 minutes in the professional and overall care of this patient.      Moose Moy MD

## 2024-05-13 NOTE — PROGRESS NOTES
Pt states he takes 325mg Asprin every day, current order is 81mg.  [facility]

## 2024-05-13 NOTE — H&P
"Chief complaint: Dizziness    History Of Present Illness  Yanick Kruse is a 83 y.o. male with a past medical history of hypertension, osteomyelitis of the lumbar spine, on chronic antibiotics, follows with Dr. Arriaga, hyperlipidemia, stenosis of right carotid artery, and chronic back pain status post spine surgeries who presented to the emergency department with complaints of dizziness.  Patient states that he has been having headaches for the last several months that he had been attributing to his sinuses.  States that he had also been having lightheadedness and dizziness as well however not as severe.  He was treated with a Z-Clark for a sinus infection several weeks ago.  States that he did have an episode of \"passing out\" resulting in a fall on April 12.  He does have chronic neuropathy however states that he noticed several weeks ago that his left hand has been weaker and that he has been dropping things.  Patient reported to the nurse in the ED that he felt as if he was going to \"pass out\" yesterday.  He reported feeling like the room had been spinning.  Reported that this has been going on for several months however was significantly worse today.  Denies chest pain, shortness of breath, fevers, nausea, or vomiting.  Does report chills.  No abdominal discomfort.    In the ED: No leukocytosis.  H&H 13.1/39.6.  Platelets 232.  Sodium 141, potassium 4.3.  BUN/creatinine 23/1.0 which appears to be his baseline.  UA was unremarkable.  CT of the head showed no acute intracranial process.  He was hypertensive with a systolic in the 170s.  Troponin mildly elevated at 21, repeat 23.  He was given meclizine, Zofran, and IV fluids in the ED.  Admitted to [facility] for further evaluation and treatment.    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 He does not have any smokeless tobacco history on file. He reports that he does not drink alcohol and does not use drugs.  Quit smoking 50 years ago.     Family History  Family History   Problem Relation Name Age of Onset    Heart disease Mother      Stomach cancer Father          Allergies  Penicillins, Codeine, Naproxen, Sulfa (sulfonamide antibiotics), and Vardenafil    Review of Systems   Constitutional:  Positive for chills. Negative for appetite change, fatigue and fever.   HENT:  Negative for congestion and rhinorrhea.    Respiratory:  Positive for cough. Negative for chest tightness and shortness of breath.    Cardiovascular:  Negative for chest pain and palpitations.   Gastrointestinal:  Negative for abdominal distention, abdominal pain, constipation, diarrhea, nausea and vomiting.   Genitourinary:  Negative for dysuria and urgency.   Neurological:  Positive for dizziness, light-headedness and headaches. Negative for numbness.   All other systems reviewed and are negative.       Physical Exam  Vitals reviewed.   Constitutional:       Appearance: Normal appearance.   HENT:      Head: Normocephalic and atraumatic.      Nose: Nose normal.      Mouth/Throat:      Mouth: Mucous membranes are moist.   Eyes:      Extraocular Movements: Extraocular movements intact.      Conjunctiva/sclera: Conjunctivae normal.   Cardiovascular:      Rate and Rhythm: Normal rate and regular rhythm.   Pulmonary:      Effort: Pulmonary effort is normal.      Breath sounds: Normal breath sounds. No wheezing, rhonchi or rales.   Abdominal:      General:  "Bowel sounds are normal.      Palpations: Abdomen is soft.      Tenderness: There is no abdominal tenderness.   Musculoskeletal:         General: Normal range of motion.      Cervical back: Normal range of motion and neck supple.   Skin:     General: Skin is warm and dry.      Capillary Refill: Capillary refill takes less than 2 seconds.   Neurological:      Mental Status: He is alert and oriented to person, place, and time.      Motor: Weakness present.      Comments: Left hand slightly weaker than the right with decreased sensation   Psychiatric:         Mood and Affect: Mood normal.         Behavior: Behavior normal.          Last Recorded Vitals  Blood pressure 172/87, pulse 86, resp. rate 18, height 1.753 m (5' 9\"), weight 122 kg (270 lb), SpO2 96%.    Relevant Results  Lab Results   Component Value Date    GLUCOSE 167 (H) 05/13/2024    CALCIUM 9.2 05/13/2024     05/13/2024    K 4.3 05/13/2024    CO2 21 (L) 05/13/2024     05/13/2024    BUN 23 05/13/2024    CREATININE 1.00 05/13/2024     Lab Results   Component Value Date    WBC 8.8 05/13/2024    HGB 13.1 (L) 05/13/2024    HCT 39.6 (L) 05/13/2024    MCV 97 05/13/2024     05/13/2024     CT head wo IV contrast  Result Date: 5/13/2024  Unremarkable exam.   Signed by: Forest Del Rosario 5/13/2024 9:06 AM Dictation workstation:   [id number]    XR shoulder left 2+ views  Result Date: 5/11/2024  IMPRESSION: Mild degenerative spurring otherwise negative exam : ANTONINA   Transcribe Date/Time: May 11 2024 11:10A Dictated by : FLORENCIA ZIMMER MD This examination was interpreted and the report reviewed and electronically signed by: FLORENCIA ZIMMER MD on May 11 2024 11:11AM  EST        Assessment/Plan   Principal Problem:    Dizziness    Dizziness rule out CVA  Reported dizziness, lightheadedness, headache  Has chronic neuropathy, decreased strength and sensation left hand  Admit to SDU  ASA/statin  permissive hypertension  lipid panel in " AM  MRI/MRA  echocardiogram  neurostroke assessment  consult neurology, appreciate recommendations    Hypertension  Hold antihypertensives for now  Permissive hypertension  PRN apresoline    Hyperlipidemia  Statin  Lipid panel in the morning    History of osteomyelitis of lumbar spine  Follows with Dr. Arriaga outpatient  Continue chronic Keflex    Chronic back pain  Resume home medications    Plan  Admit to stepdown unit for observation  Stroke workup  Consult neurology, appreciate recommendations  DVT prophylaxis: Lovenox  PT/OT  CBC, BMP, lipid, hemoglobin A1c in the morning    CODE STATUS: Discussed with the patient and his wife.  Patient is a full code.             CONSTANTINE Ken-CNP  I personally saw and examined patient.  Discussed with her family.  Discussed with Yolie Ortega NP.  Agree with her assessment and plan.

## 2024-05-13 NOTE — ED PROVIDER NOTES
HPI   Chief Complaint   Patient presents with    Dizziness       Patient is an 83-year-old male with past medical history of heart murmur, carotid stenosis presenting via EMS with dizziness.  States that he does deal with bouts of dizziness regularly, however today he said it felt significantly worse.  Says when he was getting out of bed, he felt extremely dizzy to the point where he thought he may pass out/fall over.  Does describe it as room spinning as well as being unsteady on his feet.  Endorses that yesterday, when he sat down to have a bowel movement he did briefly blacked out.  States he did not fall and it only lasted a few moments.  Endorses battling some chills as of late and believes he is suffering from a sinus infection.  Has some bouts of nausea when dizzy but states when he is seated/lying down, dizziness does resolve.  Patient denies cough, sore throat, runny nose, chest pain, shortness of breath, abdominal pain, vomiting, diarrhea or urinary complaints.  Denies history of TIA.                             No data recorded       NIH Stroke Scale: 0             Patient History   Past Medical History:   Diagnosis Date    Unspecified hearing loss, bilateral     Hearing loss of both ears     Past Surgical History:   Procedure Laterality Date    LUMBAR LAMINECTOMY  06/24/2014    Laminectomy Lumbar    MR HEAD ANGIO WO IV CONTRAST  10/27/2022    MR HEAD ANGIO WO IV CONTRAST [facility]     No family history on file.  Social History     Tobacco Use    Smoking status: Not on file    Smokeless tobacco: Not on file   Substance Use Topics    Alcohol use: Not on file    Drug use: Not on file       Physical Exam   ED Triage Vitals [05/13/24 0831]   Temp Heart Rate Respirations BP   -- 86 16 172/87      Pulse Ox Temp src Heart Rate Source Patient Position   95 % -- -- --      BP Location FiO2 (%)     -- --       Physical Exam  Constitutional:       Appearance: Normal appearance.      Comments: Awake,  uncomfortable appearing, laying in examination bed   HENT:      Head: Normocephalic and atraumatic.      Nose: Nose normal.      Mouth/Throat:      Mouth: Mucous membranes are moist.      Pharynx: Oropharynx is clear.   Eyes:      Extraocular Movements: Extraocular movements intact.      Pupils: Pupils are equal, round, and reactive to light.   Cardiovascular:      Rate and Rhythm: Normal rate and regular rhythm.      Pulses: Normal pulses.      Heart sounds: Normal heart sounds.   Pulmonary:      Effort: Pulmonary effort is normal.      Breath sounds: Normal breath sounds.   Abdominal:      General: Abdomen is flat.      Palpations: Abdomen is soft.   Musculoskeletal:         General: Normal range of motion.      Cervical back: Neck supple.   Skin:     General: Skin is warm and dry.      Capillary Refill: Capillary refill takes less than 2 seconds.   Neurological:      General: No focal deficit present.      Mental Status: He is alert and oriented to person, place, and time.   Psychiatric:         Mood and Affect: Mood normal.         Behavior: Behavior normal.         ED Course & MDM   ED Course as of 05/13/24 1528   Mon May 13, 2024   0834 EKG interpretation: Sinus rhythm with PVCs, rate 78.  Normal axis.  Less than 1 mm of ST depression noted diffusely. [ML]      ED Course User Index  [ML] Navi Miles MD         Diagnoses as of 05/13/24 1528   Dizziness       Medical Decision Making  Patient is an 83-year-old male with past medical history of heart murmur, carotid stenosis presenting via EMS with dizziness.  CBC, CMP, troponin, magnesium, UA, head CT ordered.  IV fluids, IV Zofran, meclizine ordered.  Condition considered include but are not limited to: Intracranial pathology, CVA, UTI, vertigo.  NIH stroke scale of 0.  Test of skew is negative.  Cerebral ataxia not tested due to patient being unwilling to get out of bed.  He was able to sit himself up and transition from EMS cot to ED cot.      I saw this  patient in conjunction with Dr. Miles.  CBC is without leukocytosis but does show signs of anemia with hemoglobin at 13.1.  Patient has no labs from the recent year to compare.  CMP without significant electrolyte abnormality.  Initial troponin is 21, repeat troponin is 23.  UA with micro is without infection.  Magnesium within normal limits.  CT head is without acute intracranial findings.    Patient initially said he felt slightly improved after occasions and fluids.  We did repeat medications and more fluids.  Patient states he has not seen any improvement and per nursing staff, when they got him seated he got extremely dizzy.  Patient is not demonstrating cerebral ataxia.    I spoke with Dr. Tapia.  We thoroughly discussed the patient's history, physical, laboratory and imaging findings as well as ED course.  After our discussion, it was decided to have the patient admitted for further management of dizziness with likely need for MRI to rule out posterior stroke.  As I deemed necessary from the patient's history, physical, laboratory and imaging findings as well as ED course, I considered the above listed diagnoses.    Portions of this note made with Dragon software, please be mindful of potential grammatical errors.        Medications   aspirin EC tablet 81 mg (has no administration in time range)   ketotifen (Zaditor) 0.025 % (0.035 %) ophthalmic solution 1 drop (has no administration in time range)   cephalexin (Keflex) capsule 500 mg (has no administration in time range)   docusate sodium (Colace) capsule 100 mg (has no administration in time range)   fluticasone (Flonase) nasal spray 2 spray (has no administration in time range)   gabapentin (Neurontin) tablet 600 mg (has no administration in time range)   gabapentin (Neurontin) capsule 300 mg (has no administration in time range)   multivitamin 1 tablet (has no administration in time range)   oxyCODONE-acetaminophen (Percocet) 5-325 mg per tablet 1  tablet (has no administration in time range)   acetaminophen (Tylenol) tablet 650 mg (has no administration in time range)     Or   acetaminophen (Tylenol) oral liquid 650 mg (has no administration in time range)     Or   acetaminophen (Tylenol) suppository 650 mg (has no administration in time range)   acetaminophen (Tylenol) tablet 650 mg (has no administration in time range)     Or   acetaminophen (Tylenol) oral liquid 650 mg (has no administration in time range)     Or   acetaminophen (Tylenol) suppository 650 mg (has no administration in time range)   oxygen (O2) therapy (has no administration in time range)   hydrALAZINE (Apresoline) injection 5 mg (has no administration in time range)     Followed by   hydrALAZINE (Apresoline) tablet 25 mg (has no administration in time range)   enoxaparin (Lovenox) syringe 40 mg (has no administration in time range)   ondansetron (Zofran) tablet 4 mg (has no administration in time range)     Or   ondansetron (Zofran) injection 4 mg (has no administration in time range)   magnesium hydroxide (Milk of Magnesia) 2,400 mg/10 mL suspension 10 mL (has no administration in time range)   atorvastatin (Lipitor) tablet 40 mg (has no administration in time range)   sodium chloride 0.9 % bolus 1,000 mL (0 mL intravenous Stopped 5/13/24 0938)   ondansetron (Zofran) injection 4 mg (4 mg intravenous Given 5/13/24 0844)   meclizine (Antivert) tablet 25 mg (25 mg oral Given 5/13/24 0843)   meclizine (Antivert) tablet 25 mg (25 mg oral Given 5/13/24 1211)   sodium chloride 0.9 % bolus 1,000 mL (1,000 mL intravenous New Bag 5/13/24 1211)         Labs Reviewed   COMPREHENSIVE METABOLIC PANEL - Abnormal       Result Value    Glucose 167 (*)     Sodium 141      Potassium 4.3      Chloride 106      Bicarbonate 21 (*)     Urea Nitrogen 23      Creatinine 1.00      eGFR 75      Calcium 9.2      Albumin 4.2      Alkaline Phosphatase 70      Total Protein 7.3      AST 26      Bilirubin, Total 0.6       ALT 22      Anion Gap 14     CBC WITH AUTO DIFFERENTIAL - Abnormal    WBC 8.8      nRBC 0.0      RBC 4.07 (*)     Hemoglobin 13.1 (*)     Hematocrit 39.6 (*)     MCV 97      MCH 32.2      MCHC 33.1      RDW 12.8      Platelets 232      Neutrophils % 63.6      Immature Granulocytes %, Automated 0.2      Lymphocytes % 24.5      Monocytes % 8.5      Eosinophils % 2.4      Basophils % 0.8      Neutrophils Absolute 5.57 (*)     Immature Granulocytes Absolute, Automated 0.02      Lymphocytes Absolute 2.14      Monocytes Absolute 0.74      Eosinophils Absolute 0.21      Basophils Absolute 0.07     SERIAL TROPONIN, INITIAL ([facility]) - Abnormal    Troponin T, High Sensitivity 21 (*)    TROPONIN T, HIGH SENSITIVITY - Abnormal    Troponin T, High Sensitivity 23 (*)    URINALYSIS WITH REFLEX CULTURE AND MICROSCOPIC - Normal    Color, Urine Light-Yellow      Appearance, Urine Clear      Specific Gravity, Urine 1.024      pH, Urine 5.5      Protein, Urine NEGATIVE      Glucose, Urine Normal      Blood, Urine NEGATIVE      Ketones, Urine NEGATIVE      Bilirubin, Urine NEGATIVE      Urobilinogen, Urine Normal      Nitrite, Urine NEGATIVE      Leukocyte Esterase, Urine NEGATIVE     MAGNESIUM - Normal    Magnesium 2.20     TROPONIN T SERIES, HIGH SENSITIVITY (0, 2 HR, 6 HR)    Narrative:     The following orders were created for panel order Troponin T Series, High Sensitivity (0, 2HR, 6HR).  Procedure                               Abnormality         Status                     ---------                               -----------         ------                     Serial Troponin, Initial...[[id number]]  Abnormal            Final result                 Please view results for these tests on the individual orders.   URINALYSIS WITH REFLEX CULTURE AND MICROSCOPIC    Narrative:     The following orders were created for panel order Urinalysis with Reflex Culture and Microscopic.  Procedure                               Abnormality          Status                     ---------                               -----------         ------                     Urinalysis with Reflex C...[[id number]]  Normal              Final result               Extra Urine Gray Tube[[id number]]                            In process                   Please view results for these tests on the individual orders.   EXTRA URINE GRAY TUBE   POCT GLUCOSE METER   POCT GLUCOSE METER         CT head wo IV contrast   Final Result   Unremarkable exam.        Signed by: Forest Del Rosario 5/13/2024 9:06 AM   Dictation workstation:   [id number]      MR brain wo IV contrast    (Results Pending)   MR angio head wo IV contrast    (Results Pending)   MR angio neck wo IV contrast    (Results Pending)   Transthoracic Echo (TTE) Complete    (Results Pending)         Procedure  Procedures     Rainer Acevedo PA-C  05/13/24 1528

## 2024-05-14 ENCOUNTER — APPOINTMENT (OUTPATIENT)
Dept: CARDIOLOGY | Facility: HOSPITAL | Age: 84
DRG: 153 | End: 2024-05-14
Payer: MEDICARE

## 2024-05-14 LAB
ANION GAP SERPL CALC-SCNC: 11 MMOL/L
BUN SERPL-MCNC: 17 MG/DL (ref 8–25)
CALCIUM SERPL-MCNC: 9.1 MG/DL (ref 8.5–10.4)
CHLORIDE SERPL-SCNC: 108 MMOL/L (ref 97–107)
CHOLEST SERPL-MCNC: 86 MG/DL (ref 133–200)
CHOLEST/HDLC SERPL: 3 {RATIO}
CO2 SERPL-SCNC: 24 MMOL/L (ref 24–31)
CREAT SERPL-MCNC: 1.1 MG/DL (ref 0.4–1.6)
EGFRCR SERPLBLD CKD-EPI 2021: 67 ML/MIN/1.73M*2
ERYTHROCYTE [DISTWIDTH] IN BLOOD BY AUTOMATED COUNT: 12.9 % (ref 11.5–14.5)
EST. AVERAGE GLUCOSE BLD GHB EST-MCNC: 134 MG/DL
GLUCOSE BLD MANUAL STRIP-MCNC: 107 MG/DL (ref 74–99)
GLUCOSE BLD MANUAL STRIP-MCNC: 115 MG/DL (ref 74–99)
GLUCOSE BLD MANUAL STRIP-MCNC: 138 MG/DL (ref 74–99)
GLUCOSE SERPL-MCNC: 114 MG/DL (ref 65–99)
HBA1C MFR BLD: 6.3 %
HCT VFR BLD AUTO: 37.1 % (ref 41–52)
HDLC SERPL-MCNC: 29 MG/DL
HGB BLD-MCNC: 12.5 G/DL (ref 13.5–17.5)
LDLC SERPL CALC-MCNC: 13 MG/DL (ref 65–130)
MCH RBC QN AUTO: 32.2 PG (ref 26–34)
MCHC RBC AUTO-ENTMCNC: 33.7 G/DL (ref 32–36)
MCV RBC AUTO: 96 FL (ref 80–100)
NRBC BLD-RTO: 0 /100 WBCS (ref 0–0)
PLATELET # BLD AUTO: 220 X10*3/UL (ref 150–450)
POTASSIUM SERPL-SCNC: 4.4 MMOL/L (ref 3.4–5.1)
RBC # BLD AUTO: 3.88 X10*6/UL (ref 4.5–5.9)
SODIUM SERPL-SCNC: 143 MMOL/L (ref 133–145)
TRIGL SERPL-MCNC: 220 MG/DL (ref 40–150)
WBC # BLD AUTO: 8.8 X10*3/UL (ref 4.4–11.3)

## 2024-05-14 PROCEDURE — 80048 BASIC METABOLIC PNL TOTAL CA: CPT | Performed by: NURSE PRACTITIONER

## 2024-05-14 PROCEDURE — 2500000002 HC RX 250 W HCPCS SELF ADMINISTERED DRUGS (ALT 637 FOR MEDICARE OP, ALT 636 FOR OP/ED): Performed by: NURSE PRACTITIONER

## 2024-05-14 PROCEDURE — 2500000001 HC RX 250 WO HCPCS SELF ADMINISTERED DRUGS (ALT 637 FOR MEDICARE OP): Performed by: NURSE PRACTITIONER

## 2024-05-14 PROCEDURE — 82374 ASSAY BLOOD CARBON DIOXIDE: CPT | Performed by: NURSE PRACTITIONER

## 2024-05-14 PROCEDURE — 2060000001 HC INTERMEDIATE ICU ROOM DAILY

## 2024-05-14 PROCEDURE — 93880 EXTRACRANIAL BILAT STUDY: CPT

## 2024-05-14 PROCEDURE — 83036 HEMOGLOBIN GLYCOSYLATED A1C: CPT | Performed by: NURSE PRACTITIONER

## 2024-05-14 PROCEDURE — 84478 ASSAY OF TRIGLYCERIDES: CPT | Performed by: NURSE PRACTITIONER

## 2024-05-14 PROCEDURE — 99223 1ST HOSP IP/OBS HIGH 75: CPT | Performed by: NURSE PRACTITIONER

## 2024-05-14 PROCEDURE — 97530 THERAPEUTIC ACTIVITIES: CPT | Mod: GP

## 2024-05-14 PROCEDURE — 97166 OT EVAL MOD COMPLEX 45 MIN: CPT | Mod: GO

## 2024-05-14 PROCEDURE — 93880 EXTRACRANIAL BILAT STUDY: CPT | Performed by: SURGERY

## 2024-05-14 PROCEDURE — 82947 ASSAY GLUCOSE BLOOD QUANT: CPT

## 2024-05-14 PROCEDURE — 85027 COMPLETE CBC AUTOMATED: CPT | Performed by: NURSE PRACTITIONER

## 2024-05-14 PROCEDURE — 2500000004 HC RX 250 GENERAL PHARMACY W/ HCPCS (ALT 636 FOR OP/ED): Performed by: NURSE PRACTITIONER

## 2024-05-14 PROCEDURE — 97161 PT EVAL LOW COMPLEX 20 MIN: CPT | Mod: GP

## 2024-05-14 PROCEDURE — 97535 SELF CARE MNGMENT TRAINING: CPT | Mod: GO

## 2024-05-14 PROCEDURE — 93306 TTE W/DOPPLER COMPLETE: CPT

## 2024-05-14 PROCEDURE — 36415 COLL VENOUS BLD VENIPUNCTURE: CPT | Performed by: NURSE PRACTITIONER

## 2024-05-14 RX ADMIN — CEPHALEXIN 500 MG: 500 CAPSULE ORAL at 20:43

## 2024-05-14 RX ADMIN — GABAPENTIN 600 MG: 600 TABLET, FILM COATED ORAL at 20:43

## 2024-05-14 RX ADMIN — PERFLUTREN 1 ML OF DILUTION: 6.52 INJECTION, SUSPENSION INTRAVENOUS at 08:47

## 2024-05-14 RX ADMIN — OXYCODONE AND ACETAMINOPHEN 1 TABLET: 5; 325 TABLET ORAL at 20:44

## 2024-05-14 RX ADMIN — DOCUSATE SODIUM 100 MG: 100 CAPSULE, LIQUID FILLED ORAL at 20:43

## 2024-05-14 RX ADMIN — DOCUSATE SODIUM 100 MG: 100 CAPSULE, LIQUID FILLED ORAL at 09:50

## 2024-05-14 RX ADMIN — ASPIRIN 325 MG: 325 TABLET, COATED ORAL at 09:50

## 2024-05-14 RX ADMIN — MULTIVITAMIN TABLET 1 TABLET: TABLET at 09:50

## 2024-05-14 RX ADMIN — KETOTIFEN FUMARATE 1 DROP: 0.25 SOLUTION/ DROPS OPHTHALMIC at 09:50

## 2024-05-14 RX ADMIN — CEPHALEXIN 500 MG: 500 CAPSULE ORAL at 09:50

## 2024-05-14 RX ADMIN — ENOXAPARIN SODIUM 40 MG: 40 INJECTION SUBCUTANEOUS at 09:50

## 2024-05-14 RX ADMIN — FLUTICASONE PROPIONATE 2 SPRAY: 50 SPRAY, METERED NASAL at 09:50

## 2024-05-14 RX ADMIN — ATORVASTATIN CALCIUM 40 MG: 40 TABLET, FILM COATED ORAL at 20:43

## 2024-05-14 RX ADMIN — KETOTIFEN FUMARATE 1 DROP: 0.25 SOLUTION/ DROPS OPHTHALMIC at 20:43

## 2024-05-14 RX ADMIN — GABAPENTIN 300 MG: 300 CAPSULE ORAL at 09:50

## 2024-05-14 SDOH — ECONOMIC STABILITY: FOOD INSECURITY: WITHIN THE PAST 12 MONTHS, YOU WORRIED THAT YOUR FOOD WOULD RUN OUT BEFORE YOU GOT THE MONEY TO BUY MORE.: NEVER TRUE

## 2024-05-14 SDOH — SOCIAL STABILITY: SOCIAL NETWORK: ARE YOU MARRIED, WIDOWED, DIVORCED, SEPARATED, NEVER MARRIED, OR LIVING WITH A PARTNER?: MARRIED

## 2024-05-14 SDOH — ECONOMIC STABILITY: HOUSING INSECURITY
IN THE LAST 12 MONTHS, WAS THERE A TIME WHEN YOU DID NOT HAVE A STEADY PLACE TO SLEEP OR SLEPT IN A SHELTER (INCLUDING NOW)?: NO

## 2024-05-14 SDOH — ECONOMIC STABILITY: INCOME INSECURITY: IN THE PAST 12 MONTHS, HAS THE ELECTRIC, GAS, OIL, OR WATER COMPANY THREATENED TO SHUT OFF SERVICE IN YOUR HOME?: NO

## 2024-05-14 SDOH — ECONOMIC STABILITY: FOOD INSECURITY: HOW HARD IS IT FOR YOU TO PAY FOR THE VERY BASICS LIKE FOOD, HOUSING, MEDICAL CARE, AND HEATING?: NOT HARD AT ALL

## 2024-05-14 SDOH — ECONOMIC STABILITY: TRANSPORTATION INSECURITY: IN THE PAST 12 MONTHS, HAS LACK OF TRANSPORTATION KEPT YOU FROM MEDICAL APPOINTMENTS OR FROM GETTING MEDICATIONS?: NO

## 2024-05-14 SDOH — HEALTH STABILITY: PHYSICAL HEALTH: ON AVERAGE, HOW MANY DAYS PER WEEK DO YOU ENGAGE IN MODERATE TO STRENUOUS EXERCISE (LIKE A BRISK WALK)?: 3 DAYS

## 2024-05-14 SDOH — HEALTH STABILITY: MENTAL HEALTH: HOW OFTEN DO YOU HAVE A DRINK CONTAINING ALCOHOL?: NEVER

## 2024-05-14 SDOH — SOCIAL STABILITY: SOCIAL NETWORK: HOW OFTEN DO YOU GET TOGETHER WITH FRIENDS OR RELATIVES?: MORE THAN THREE TIMES A WEEK

## 2024-05-14 SDOH — SOCIAL STABILITY: SOCIAL INSECURITY
WITHIN THE LAST YEAR, HAVE YOU BEEN KICKED, HIT, SLAPPED, OR OTHERWISE PHYSICALLY HURT BY YOUR PARTNER OR EX-PARTNER?: NO

## 2024-05-14 SDOH — HEALTH STABILITY: MENTAL HEALTH: HOW MANY DRINKS CONTAINING ALCOHOL DO YOU HAVE ON A TYPICAL DAY WHEN YOU ARE DRINKING?: PATIENT DOES NOT DRINK

## 2024-05-14 SDOH — ECONOMIC STABILITY: FOOD INSECURITY: WITHIN THE PAST 12 MONTHS, YOU WORRIED THAT YOUR FOOD WOULD RUN OUT BEFORE YOU GOT MONEY TO BUY MORE.: NEVER TRUE

## 2024-05-14 SDOH — ECONOMIC STABILITY: HOUSING INSECURITY: IN THE LAST 12 MONTHS, HOW MANY PLACES HAVE YOU LIVED?: 1

## 2024-05-14 SDOH — SOCIAL STABILITY: SOCIAL INSECURITY: ARE YOU MARRIED, WIDOWED, DIVORCED, SEPARATED, NEVER MARRIED, OR LIVING WITH A PARTNER?: MARRIED

## 2024-05-14 SDOH — ECONOMIC STABILITY: INCOME INSECURITY: IN THE LAST 12 MONTHS, WAS THERE A TIME WHEN YOU WERE NOT ABLE TO PAY THE MORTGAGE OR RENT ON TIME?: NO

## 2024-05-14 SDOH — ECONOMIC STABILITY: INCOME INSECURITY: IN THE PAST 12 MONTHS HAS THE ELECTRIC, GAS, OIL, OR WATER COMPANY THREATENED TO SHUT OFF SERVICES IN YOUR HOME?: NO

## 2024-05-14 SDOH — HEALTH STABILITY: PHYSICAL HEALTH: ON AVERAGE, HOW MANY MINUTES DO YOU ENGAGE IN EXERCISE AT THIS LEVEL?: 30 MIN

## 2024-05-14 SDOH — ECONOMIC STABILITY: FOOD INSECURITY: WITHIN THE PAST 12 MONTHS, THE FOOD YOU BOUGHT JUST DIDN'T LAST AND YOU DIDN'T HAVE MONEY TO GET MORE.: NEVER TRUE

## 2024-05-14 SDOH — SOCIAL STABILITY: SOCIAL INSECURITY
WITHIN THE LAST YEAR, HAVE YOU BEEN RAPED OR FORCED TO HAVE ANY KIND OF SEXUAL ACTIVITY BY YOUR PARTNER OR EX-PARTNER?: NO

## 2024-05-14 SDOH — SOCIAL STABILITY: SOCIAL INSECURITY: WITHIN THE LAST YEAR, HAVE YOU BEEN HUMILIATED OR EMOTIONALLY ABUSED IN OTHER WAYS BY YOUR PARTNER OR EX-PARTNER?: NO

## 2024-05-14 SDOH — ECONOMIC STABILITY: INCOME INSECURITY: HOW HARD IS IT FOR YOU TO PAY FOR THE VERY BASICS LIKE FOOD, HOUSING, MEDICAL CARE, AND HEATING?: NOT HARD AT ALL

## 2024-05-14 SDOH — HEALTH STABILITY: MENTAL HEALTH: HOW OFTEN DO YOU HAVE SIX OR MORE DRINKS ON ONE OCCASION?: NEVER

## 2024-05-14 SDOH — SOCIAL STABILITY: SOCIAL INSECURITY: WITHIN THE LAST YEAR, HAVE YOU BEEN AFRAID OF YOUR PARTNER OR EX-PARTNER?: NO

## 2024-05-14 SDOH — ECONOMIC STABILITY: TRANSPORTATION INSECURITY
IN THE PAST 12 MONTHS, HAS THE LACK OF TRANSPORTATION KEPT YOU FROM MEDICAL APPOINTMENTS OR FROM GETTING MEDICATIONS?: NO

## 2024-05-14 SDOH — SOCIAL STABILITY: SOCIAL NETWORK
IN A TYPICAL WEEK, HOW MANY TIMES DO YOU TALK ON THE PHONE WITH FAMILY, FRIENDS, OR NEIGHBORS?: MORE THAN THREE TIMES A WEEK

## 2024-05-14 SDOH — ECONOMIC STABILITY: HOUSING INSECURITY: IN THE LAST 12 MONTHS, WAS THERE A TIME WHEN YOU WERE NOT ABLE TO PAY THE MORTGAGE OR RENT ON TIME?: NO

## 2024-05-14 SDOH — SOCIAL STABILITY: SOCIAL INSECURITY
WITHIN THE LAST YEAR, HAVE TO BEEN RAPED OR FORCED TO HAVE ANY KIND OF SEXUAL ACTIVITY BY YOUR PARTNER OR EX-PARTNER?: NO

## 2024-05-14 SDOH — HEALTH STABILITY: MENTAL HEALTH: HOW OFTEN DO YOU HAVE 6 OR MORE DRINKS ON ONE OCCASION?: NEVER

## 2024-05-14 SDOH — HEALTH STABILITY: MENTAL HEALTH: HOW MANY STANDARD DRINKS CONTAINING ALCOHOL DO YOU HAVE ON A TYPICAL DAY?: PATIENT DOES NOT DRINK

## 2024-05-14 SDOH — ECONOMIC STABILITY: TRANSPORTATION INSECURITY
IN THE PAST 12 MONTHS, HAS LACK OF TRANSPORTATION KEPT YOU FROM MEETINGS, WORK, OR FROM GETTING THINGS NEEDED FOR DAILY LIVING?: NO

## 2024-05-14 ASSESSMENT — COGNITIVE AND FUNCTIONAL STATUS - GENERAL
PERSONAL GROOMING: A LITTLE
PERSONAL GROOMING: A LITTLE
MOBILITY SCORE: 13
WALKING IN HOSPITAL ROOM: A LOT
STANDING UP FROM CHAIR USING ARMS: A LOT
DRESSING REGULAR UPPER BODY CLOTHING: A LITTLE
MOVING FROM LYING ON BACK TO SITTING ON SIDE OF FLAT BED WITH BEDRAILS: A LITTLE
STANDING UP FROM CHAIR USING ARMS: A LOT
TURNING FROM BACK TO SIDE WHILE IN FLAT BAD: A LOT
DRESSING REGULAR UPPER BODY CLOTHING: A LITTLE
DAILY ACTIVITIY SCORE: 16
DRESSING REGULAR LOWER BODY CLOTHING: A LOT
PERSONAL GROOMING: A LITTLE
CLIMB 3 TO 5 STEPS WITH RAILING: A LOT
TURNING FROM BACK TO SIDE WHILE IN FLAT BAD: TOTAL
HELP NEEDED FOR BATHING: A LOT
DRESSING REGULAR UPPER BODY CLOTHING: A LOT
DAILY ACTIVITIY SCORE: 16
MOBILITY SCORE: 13
DRESSING REGULAR LOWER BODY CLOTHING: A LOT
WALKING IN HOSPITAL ROOM: A LOT
MOVING FROM LYING ON BACK TO SITTING ON SIDE OF FLAT BED WITH BEDRAILS: A LITTLE
CLIMB 3 TO 5 STEPS WITH RAILING: TOTAL
STANDING UP FROM CHAIR USING ARMS: A LOT
TOILETING: A LOT
TURNING FROM BACK TO SIDE WHILE IN FLAT BAD: A LOT
HELP NEEDED FOR BATHING: A LOT
HELP NEEDED FOR BATHING: A LOT
MOVING TO AND FROM BED TO CHAIR: A LOT
DRESSING REGULAR LOWER BODY CLOTHING: A LOT
WALKING IN HOSPITAL ROOM: A LOT
TOILETING: A LOT
MOVING TO AND FROM BED TO CHAIR: A LOT
TOILETING: A LITTLE
DAILY ACTIVITIY SCORE: 16
MOBILITY SCORE: 11
MOVING FROM LYING ON BACK TO SITTING ON SIDE OF FLAT BED WITH BEDRAILS: A LITTLE
MOVING TO AND FROM BED TO CHAIR: A LOT
CLIMB 3 TO 5 STEPS WITH RAILING: A LOT

## 2024-05-14 ASSESSMENT — PAIN DESCRIPTION - LOCATION: LOCATION: SHOULDER

## 2024-05-14 ASSESSMENT — PAIN - FUNCTIONAL ASSESSMENT
PAIN_FUNCTIONAL_ASSESSMENT: 0-10

## 2024-05-14 ASSESSMENT — ACTIVITIES OF DAILY LIVING (ADL)
HOME_MANAGEMENT_TIME_ENTRY: 14
BATHING_ASSISTANCE: MODERATE
LACK_OF_TRANSPORTATION: NO
ADL_ASSISTANCE: NEEDS ASSISTANCE

## 2024-05-14 ASSESSMENT — LIFESTYLE VARIABLES
AUDIT-C TOTAL SCORE: 0
SKIP TO QUESTIONS 9-10: 1

## 2024-05-14 ASSESSMENT — PAIN SCALES - GENERAL
PAINLEVEL_OUTOF10: 8
PAINLEVEL_OUTOF10: 0 - NO PAIN
PAINLEVEL_OUTOF10: 4
PAINLEVEL_OUTOF10: 0 - NO PAIN

## 2024-05-14 ASSESSMENT — PAIN DESCRIPTION - DESCRIPTORS: DESCRIPTORS: DISCOMFORT;DULL

## 2024-05-14 ASSESSMENT — PAIN DESCRIPTION - ORIENTATION: ORIENTATION: LEFT

## 2024-05-14 NOTE — CARE PLAN
The patient's goals for the shift include feel better    The clinical goals for the shift include no fall    Over the shift, the patient did not make progress toward the following goals. Barriers to progression include   . Recommendations to address these barriers include   .

## 2024-05-14 NOTE — CARE PLAN
Problem: Bathing  Goal: STG - Patient will bathe UB/LB with close supervision and AE  Outcome: Progressing     Problem: Dressings Lower Extremities  Goal: LTG - Patient will dress lower body with close supervision and AE  Outcome: Progressing     Problem: Dressing Upper Extremities  Goal: LTG - Patient will complete upper body dressing independent  Outcome: Progressing     Problem: Grooming  Goal: STG - Patient completes grooming independent  Outcome: Progressing     Problem: Functional Mobility  Goal: Perform functional mobility to and from the bathroom independent with 2ww  Outcome: Progressing     Problem: Toileting  Goal: LTG - Patient will complete daily toileting tasks independent with 2ww  Outcome: Progressing     Problem: OT Transfers  Goal: perform  all functional transfers independent with 2ww  Outcome: Progressing

## 2024-05-14 NOTE — PROGRESS NOTES
05/14/24 1306   Current Planned Discharge Disposition   Current Planned Discharge Disposition Home

## 2024-05-14 NOTE — PROGRESS NOTES
TCC met with patient at the bedside. Pt lives in a split level house with spouse, 6 steps up and 6 down. Pt is not on oxygen or on dialysis. Pt does not use a cane or a walker. Pt drives and can shop, cook and clean. There are no services in the home. Pt does not smoke or drink alcohol. Pt is borderline diabetic. PCP is Dr. Black at [facility]. Pharmacy of choice is [facility] on [facility]. Pt is not a . Pt has a LW and POA is spouse. Pt plans to return home at the time of discharge. Waiting for PT/OT eval and recommendations. TCC to follow.     ANTICIPATED DISCHARGE PLAN TO RETURN HOME NO SKILLED NEEDS.        05/14/24 1301   Discharge Planning   Living Arrangements Spouse/significant other   Support Systems Spouse/significant other   Assistance Needed none   Type of Residence Private residence   Number of Stairs to Enter Residence 6   Number of Stairs Within Residence 6   Do you have animals or pets at home? No   Who is requesting discharge planning? Provider   Home or Post Acute Services None   Patient expects to be discharged to: home   Does the patient need discharge transport arranged? No   Financial Resource Strain   How hard is it for you to pay for the very basics like food, housing, medical care, and heating? Not hard   Housing Stability   In the last 12 months, was there a time when you were not able to pay the mortgage or rent on time? N   In the last 12 months, how many places have you lived? 1   In the last 12 months, was there a time when you did not have a steady place to sleep or slept in a shelter (including now)? N   Transportation Needs   In the past 12 months, has lack of transportation kept you from medical appointments or from getting medications? no   In the past 12 months, has lack of transportation kept you from meetings, work, or from getting things needed for daily living? No

## 2024-05-14 NOTE — PROGRESS NOTES
Occupational Therapy    Evaluation/Treatment    Patient Name: Yanick Kruse  MRN: [id number]  : 1940  Today's Date: 24  Time Calculation  Start Time: 1301  Stop Time: 1330  Time Calculation (min): 29 min       Assessment:  OT Assessment: Referral received, chart reviewed, and evaluation complete.  Presents with decreased standing balance and tolerance, dizziness, and weakness.  Would benefit from acute OT services.  Recommend moderate intensity rehab upon discharge.  Prognosis: Good  Barriers to Discharge: None  Evaluation/Treatment Tolerance: Other (Comment) (limited by c/o dizziness)  Medical Staff Made Aware: Yes  End of Session Communication: Bedside nurse  End of Session Patient Position: Up in chair    Plan:  Treatment Interventions: ADL retraining, UE strengthening/ROM, Patient/family training, Neuromuscular reeducation, Compensatory technique education  OT Frequency: 4 times per week  Equipment Recommended upon Discharge: Wheeled walker  OT Recommended Transfer Status: Minimal assist, Assist of 1  OT - OK to Discharge: Yes  Treatment Interventions: ADL retraining, UE strengthening/ROM, Patient/family training, Neuromuscular reeducation, Compensatory technique education    Subjective   Current Problem:  1. Dizziness  Transthoracic Echo (TTE) Complete    Transthoracic Echo (TTE) Complete      2. Vertigo due to acute cerebrovascular disease  Transthoracic Echo (TTE) Complete    Transthoracic Echo (TTE) Complete      3. Stenosis of right carotid artery  Vascular US Carotid Artery Duplex Bilateral    Vascular US Carotid Artery Duplex Bilateral        General:   OT Received On: 24  General  Reason for Referral: decreased functional status  Referred By: Yolie Ortega CNP  Past Medical History Relevant to Rehab: Yolie Ortega CNP  Family/Caregiver Present: Yes (The patient's son was present)  Co-Treatment: PT  Co-Treatment Reason: optimization of patient outcomes  Prior to Session  Communication: Bedside nurse  Patient Position Received: Bed, 3 rail up  General Comment: 83 year old WM admitted with c/o dizziness    Precautions:  Hearing/Visual Limitations: wears glasses, [place]  Medical Precautions: Fall precautions    Vital Signs:  Heart Rate: 77  Heart Rate Source: Monitor  BP: 164/70  MAP (mmHg): 97  BP Location: Right arm  BP Method: Automatic    Pain:  Pain Assessment  Pain Assessment: 0-10  Pain Score: 0 - No pain    Objective   Cognition:  Overall Cognitive Status: Within Functional Limits  Orientation Level: Oriented X4  Memory: Exceptions to WFL      Home Living:  Type of Home: House  Lives With: Spouse  Home Adaptive Equipment: Walker rolling or standard  Home Layout: Two level  Alternate Level Stairs-Rails: Right  Alternate Level Stairs-Number of Steps: 6  Bathroom Shower/Tub: Tub/shower unit  Bathroom Toilet: Handicapped height  Bathroom Equipment: Shower chair with back, Grab bars in shower    Prior Function:  Level of [place]: Needs assistance with ADLs, Needs assistance with homemaking  Prior Function Comments: reports that his spouse assists with don/doff socks, and they share IADLs tasks    IADL History:  Current License: Yes  Mode of Transportation: Car    ADL:  Eating Assistance: Independent  Grooming Assistance: Independent  Bathing Assistance: Moderate  UE Dressing Assistance: Minimal  LE Dressing Assistance: Moderate  Toileting Assistance with Device: Moderate  Functional Assistance: Minimal    Activities of Daily Living: LE Dressing  LE Dressing: Yes  Pants Level of Assistance: Moderate assistance  Sock Level of Assistance: Maximum assistance  LE Dressing Where Assessed: Edge of bed  LE Dressing Comments: reports he uses a sock aide or gets help from his wife at [facility]    Activity Tolerance:  Endurance: Decreased tolerance for upright activites    Bed Mobility/Transfers: Bed Mobility  Bed Mobility: Yes  Bed Mobility 1  Bed Mobility 1: Supine to sitting  Level of  Assistance 1: Moderate assistance  Bed Mobility Comments 1: Upon sitting he c/o dizziness.  BP was taken but it did indicate a significant change    Transfers  Transfer: Yes  Transfer 1  Transfer From 1: Bed to  Transfer to 1: Stand  Technique 1: Sit to stand  Transfer Device 1: Walker  Transfer Level of Assistance 1: Minimum assistance  Trials/Comments 1: min assist x2  Transfers 2  Transfer From 2: Stand to  Transfer to 2: Chair with arms  Technique 2: Stand to sit  Transfer Device 2: Walker  Transfer Level of Assistance 2: Minimum assistance  Trials/Comments 2: minimal assist x2    Sitting Balance:  Static Sitting Balance  Static Sitting-Balance Support: Feet unsupported  Static Sitting-Level of Assistance: Close supervision    Standing Balance:  Static Standing Balance  Static Standing-Balance Support: Bilateral upper extremity supported  Static Standing-Level of Assistance: Minimum assistance       Sensation:  Light Touch: Partial deficits in the LUE, Partial deficits in the RUE    Strength:  Strength Comments: BUE 3+/5 grossly     Hand Function:  Hand Function  Gross Grasp: Functional  Coordination: Impaired    Outcome Measures: [facility] Daily Activity  Putting on and taking off regular lower body clothing: A lot  Bathing (including washing, rinsing, drying): A lot  Putting on and taking off regular upper body clothing: A little  Toileting, which includes using toilet, bedpan or urinal: A lot  Taking care of personal grooming such as brushing teeth: A little  Eating Meals: None  Daily Activity - Total Score: 16      Goals:           Problem: Bathing  Goal: STG - Patient will bathe UB/LB with close supervision and AE  Outcome: Progressing     Problem: Dressings Lower Extremities  Goal: LTG - Patient will dress lower body with close supervision and AE  Outcome: Progressing     Problem: Dressing Upper Extremities  Goal: LTG - Patient will complete upper body dressing independent  Outcome: Progressing     Problem:  Grooming  Goal: STG - Patient completes grooming independent  Outcome: Progressing     Problem: Functional Mobility  Goal: Perform functional mobility to and from the bathroom independent with 2ww  Outcome: Progressing     Problem: Toileting  Goal: LTG - Patient will complete daily toileting tasks independent with 2ww  Outcome: Progressing     Problem: OT Transfers  Goal: perform  all functional transfers independent with 2ww  Outcome: Progressing

## 2024-05-14 NOTE — PROGRESS NOTES
"Physical Therapy    Physical Therapy Evaluation & Treatment    Patient Name: Yanick Kruse  MRN: [id number]  Today's Date: 5/14/2024   Time Calculation  Start Time: 1300  Stop Time: 1328  Time Calculation (min): 28 min    Assessment/Plan   PT Assessment  PT Assessment Results: Decreased strength, Decreased range of motion, Decreased endurance, Impaired balance, Decreased mobility, Decreased coordination, Decreased safety awareness, Impaired vision, Impaired hearing, Impaired sensation, Obesity  Rehab Prognosis: Good  Evaluation/Treatment Tolerance: Patient limited by fatigue, Other (Comment) (\" lightheadedness\")  Medical Staff Made Aware: Yes  Strengths: Ability to acquire knowledge, Attitude of self, Premorbid level of function, Support and attitude of living partners, Support of extended family/friends  Barriers to Participation: Comorbidities, Insight into problems, Housing layout  End of Session Communication: Bedside nurse  Assessment Comment: pt required min assist of 2/mod assist of 1 for the above limited mobility; Pt demonstrates decreased strength, balance, endurance, mobility ease and safety, + increased fall risk as compared to baseline. pt will benefit from mod int rehab post hospital d/c.  End of Session Patient Position: Up in chair, Alarm off, not on at start of session (call button in reach; Nurse stated bed alarm not needed at this time)   IP OR SWING BED PT PLAN  Inpatient or Swing Bed: Inpatient  PT Plan  Treatment/Interventions: Bed mobility, Transfer training, Gait training, Stair training, Balance training, Strengthening, Endurance training, Range of motion, Therapeutic exercise, Therapeutic activity  PT Plan: Skilled PT  PT Frequency: 4 times per week  PT Discharge Recommendations: Moderate intensity level of continued care  Equipment Recommended upon Discharge: Wheeled walker  PT Recommended Transfer Status: Assist x2, Other (comment) (for safety at this time)  PT - OK to Discharge: " "Yes      Subjective     General Visit Information:  General  Reason for Referral: imapired mobility; dizziness; increased BP  Referred By: Yolie Ortega CNP  Past Medical History Relevant to Rehab: HTN, multiple back surgeries, neuropathy, carotid artery disease, 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  Family/Caregiver Present: Yes  Caregiver Feedback: son  Co-Treatment: OT  Co-Treatment Reason: partial co-eval with OT to maximize safety, + decreased standing tolerance  Prior to Session Communication: Bedside nurse  Patient Position Received: Bed, 4 rail up, Alarm off, not on at start of session  Preferred Learning Style: verbal, visual  General Comment: 84 yo WM admitted to [facility] via ED, with c/o dizziness x several months with 1 episode of syncope/\" passing out.\" Given Meclazine in the ED; All tests negative for acute event except MRA revealed 75 to 80% stenosis, right carotid bulb, 60 to 65% left carotid bulb; Carotid U/S revealed > 70% stenosis right ICA---waiting for vascular surgery to comment----per 5/14 note--OK with PT. Cleared by nurse for therapy; pt agreeable to therapy.  Home Living:  Home Living  Type of Home: House  Lives With: Spouse  Home Adaptive Equipment: Walker rolling or standard, Cane  Home Layout: Two level  Alternate Level Stairs-Rails: Right  Alternate Level Stairs-Number of Steps: 6 steps up to kitchen, 12 steps up to bedroom, tub shower; 6 steps down to family room  Home Access: Stairs to enter without rails  Entrance Stairs-Rails: None  Entrance Stairs-Number of Steps: 1  Bathroom Shower/Tub: Tub/shower unit  Bathroom Toilet: Handicapped height (on main floor only)  Bathroom Equipment: Grab bars in shower, Shower chair with back  Bathroom Accessibility: 1/2 bath main floor; full bath upstairs/downstairs  Home Living Comments: split-level  Prior Level of Function:  Prior Function Per Pt/Caregiver Report  Level of Mobile: Needs assistance with ADLs, Needs assistance with " "homemaking (independent amb without device)  Receives Help From: Family (spouse  with ADL's and chores)  ADL Assistance: Needs assistance (with lower body dressing)  Homemaking Assistance: Needs assistance (pt shares chores with spouse)  Ambulatory Assistance: Independent (without device)  Vocational: Retired  Precautions:  Precautions  Hearing/Visual Limitations: glasses; mild [place]  Medical Precautions: Fall precautions  Vital Signs:  Vital Signs  Heart Rate: 72  SpO2: 96 %  BP: 167/62  MAP (mmHg): 92  BP Location: Right arm  BP Method: Automatic  Patient Position: Lying    Objective   Pain:  Pain Assessment  Pain Assessment: 0-10  Pain Score: 0 - No pain  Cognition:  Cognition  Overall Cognitive Status: Within Functional Limits  Orientation Level: Oriented X4  Safety/Judgement:  (decreased safety insight during functional mobility)  Insight: Mild    General Assessments:  General Observation  General Observation: pleasant, cooperative, c/o mild \" lightheadedness\" during initial sit/stand , hoiwever \" cleared\" after 5-6 min sititng in bedside chair               Activity Tolerance  Activity Tolerance Comments: fair    Sensation  Proprioception: Partial deficits in the RLE, Partial deficits in the LLE  Sensation Comment: pt with + neuropathy georgette hands and feet    Strength  Strength Comments: georgette hips 3-/5,knees 4-/5, ankles 3-/5  Coordination  Heel to Shin: Impaired (due to weakness and age related changes)    Postural Control  Posture Comment: obese with mild forward head, protracted shldrs    Static Sitting Balance  Static Sitting-Balance Support: Feet supported  Static Sitting-Level of Assistance: Close supervision  Static Sitting-Comment/Number of Minutes: 5 min---good balance---/77 ( 97); c/o mild dizziness  Dynamic Sitting Balance  Dynamic Sitting-Balance Support: Feet supported  Dynamic Sitting-Comments: close supervision; fair +    Static Standing Balance  Static Standing-Balance Support: Bilateral upper " extremity supported  Static Standing-Level of Assistance: Minimum assistance (x 2)  Static Standing-Comment/Number of Minutes: 1-2 min with fair balance, FWw  Dynamic Standing Balance  Dynamic Standing-Balance Support: Bilateral upper extremity supported  Dynamic Standing-Comments: FWW, min assist of 2, fair - balance  Functional Assessments:  Bed Mobility  Bed Mobility: Yes  Bed Mobility 1  Bed Mobility 1: Supine to sitting  Level of Assistance 1: Moderate assistance, Moderate verbal cues  Bed Mobility Comments 1: head of bed elevated; mod assist of 1 for trunk up, georgette LE's off of bed, verbal cues for active use of georgette UE's    Transfers  Transfer: Yes  Transfer 1  Transfer From 1: Sit to  Transfer to 1: Stand  Technique 1: Sit to stand  Transfer Device 1: Walker  Transfer Level of Assistance 1: Minimum assistance, Minimal verbal cues, +2  Trials/Comments 1: min assist of 2 for trunk up, verbal cues for proper georgette hand placmeent and forward weight shifting  Transfers 2  Transfer From 2: Stand to  Transfer to 2: Sit  Technique 2: Stand to sit  Transfer Device 2: Walker  Transfer Level of Assistance 2: Minimum assistance, +2, Minimal verbal cues  Trials/Comments 2: min assist of 2 for trunk down, verbal cues for reaching back with both hands for arms of chair    Ambulation/Gait Training  Ambulation/Gait Training Performed: Yes  Ambulation/Gait Training 1  Surface 1: Level tile  Device 1: Rolling walker  Assistance 1: Minimum assistance, Moderate verbal cues (x 2)  Quality of Gait 1: Diminished heel strike, Decreased step length, Shuffling gait, Wide base of support  Comments/Distance (ft) 1: Pt amb bed to armchair with FWW, + turn, min assist of 2, verbal cues for focusing on a stationary object, erect posture, increased step height; o2 sat 97% with HR 75 bpm  Extremity/Trunk Assessments:  Cervical Spine   Cervical Spine:  (mild forward head)  RLE   RLE :  (see above strength comments)  LLE   LLE :  (see above  strength comments)  Treatments:  Therapeutic Activity  Therapeutic Activity Performed: Yes (see bed mobility, transfers, amb with FWW comments)    Stairs  Stairs: No  Outcome Measures:  [facility] Basic Mobility  Turning from your back to your side while in a flat bed without using bedrails: A little  Moving from lying on your back to sitting on the side of a flat bed without using bedrails: Total  Moving to and from bed to chair (including a wheelchair): A lot  Standing up from a chair using your arms (e.g. wheelchair or bedside chair): A lot  To walk in hospital room: A lot  Climbing 3-5 steps with railing: Total  Basic Mobility - Total Score: 11    Encounter Problems       Encounter Problems (Active)       Balance       STG - Maintains dynamic standing balance with upper extremity support (Progressing)       Start:  05/14/24    Expected End:  06/11/24       INTERVENTIONS:  1. Practice standing with minimal support.  2. Educate patient about standing tolerance.  3. Educate patient about independence with gait, transfers, and ADL's.  4. Educate patient about use of assistive device.  5. Educate patient about self-directed care.            Mobility       STG - Patient will ambulate 300 ft, + turns, LRAD, close supervision of 1 (Progressing)       Start:  05/14/24    Expected End:  06/11/24            pt ascends/descends 12 steps with 1 handrail, reciprocally, close supervision of 1 (Progressing)       Start:  05/14/24    Expected End:  06/11/24               PT Transfers       STG - Patient to transfer to and from sit to supine mod I (Progressing)       Start:  05/14/24    Expected End:  06/11/24            STG - Patient will transfer sit to and from stand distant supervision of 1 (Progressing)       Start:  05/14/24    Expected End:  06/11/24               Safety       LTG - Patient will demonstrate safety requirements appropriate to situation/environment (Progressing)       Start:  05/14/24    Expected End:  06/11/24                    Education Documentation  Mobility Training, taught by Irena Cee, PT at 5/14/2024  2:23 PM.  Learner: Patient  Readiness: Acceptance  Method: Explanation, Demonstration  Response: Needs Reinforcement    Education Comments  No comments found.

## 2024-05-14 NOTE — PROGRESS NOTES
Yanick Kruse is a 83 y.o. male on day 1 of admission presenting with Dizziness.      Subjective   Patient seen and examined. Awake/alert/oriented. Denies dizziness or headache at this time. Does continue to have neuropathy in his hand with decreased strength in his right hand. Denies chest pain, shortness of breath, fevers, chills, nausea, or vomiting. No abdominal discomfort. MRI/MRA results reviewed with the patient.          Objective     Last Recorded Vitals  /60 (BP Location: Right arm, Patient Position: Lying)   Pulse 62   Temp 36.4 °C (97.5 °F) (Temporal)   Resp 18   Wt 125 kg (275 lb 5.7 oz)   SpO2 98%   Intake/Output last 3 Shifts:    Intake/Output Summary (Last 24 hours) at 5/14/2024 1130  Last data filed at 5/14/2024 1100  Gross per 24 hour   Intake 720 ml   Output 1300 ml   Net -580 ml       Admission Weight  Weight: 122 kg (270 lb) (05/13/24 0833)    Daily Weight  05/14/24 : 125 kg (275 lb 5.7 oz)    Image Results  MR brain wo IV contrast, MR angio head wo IV contrast, MR angio neck wo IV contrast  Narrative: Interpreted By:  Attila Raines,   STUDY:  MR BRAIN WO IV CONTRAST; MR ANGIO HEAD WO IV CONTRAST; MR ANGIO NECK  WO IV CONTRAST;  5/13/2024 10:24 pm; 5/13/2024 10:25 pm; 5/13/2024  10:26 pm      INDICATION:  Signs/Symptoms:dizziness. Stroke protocol.      COMPARISON:  CT head w/o contrast dated 05/13/2024, MRI brain 10/27/2022      ACCESSION NUMBER(S):  [id number]; [id number]; [id number]      ORDERING CLINICIAN:  SHANNAN KAUFMAN      TECHNIQUE:  Multiplanar, multi-sequence images of the brain were obtained without  contrast.      3D time-of-flight MR angiography of the head and neck was performed.  The images were reviewed as source images and maximum intensity  projections.      FINDINGS:  MRI BRAIN:      Diffusion weighted images show no evidence of acute ischemic infarct.      Slight progression of mild periventricular and subcortical  hemispheric T2/FLAIR white matter  hyperintensities are most  compatible with chronic small vessel ischemic disease.      There is no acute intraparenchymal hemorrhage, mass, mass-effect, or  an extra-axial fluid collection.      The ventricular size and cerebral volume are age-concordant.      Normal morphology of midline structures. The craniovertebral junction  is normal.      The orbits and globes are unremarkable. Bilateral lens extractions  are noted.      The paranasal sinuses show no air-fluid levels or hemorrhage.      The mastoid air cells are clear.          MRA HEAD:  There is a fetal type right posterior cerebral artery.  There is no hemodynamically significant intracranial stenosis or  large vessel occlusion. There is no intracranial aneurysm.          MRA NECK:  Source images are motion degraded.      Mild spin-dephasing artifact is noted at the carotid bulbs.      COMMON/INTERNAL CAROTID ARTERIES: There is a 60-65% stenosis of the  left carotid bulb. There is a 75-80% stenosis of the right carotid  bulb.      VERTEBRAL ARTERIES: The left vertebral artery is non dominant. No  occlusion, hemodynamically significant stenosis, or dissection.      Impression: MRI BRAIN:  1. No acute infarct, acute hemorrhage, or intracranial mass effect.  2. Slight progression of mild supratentorial chronic ischemic white  matter changes.      MRA HEAD AND NECK:  1. No evidence of major vessel occlusion or significant stenosis on  MRA head.  2. There is a 60-65% stenosis of the left carotid bulb. There is a  75-80% stenosis of the right carotid bulb.      MACRO:  None.      Signed by: Attila Raines 5/13/2024 11:08 PM  Dictation workstation:   [id number]  CT head wo IV contrast  Narrative: Interpreted By:  Forest Del Rosario,   STUDY:  CT HEAD WO IV CONTRAST; 5/13/2024 8:51 am      INDICATION:  Signs/Symptoms:dizziness.      COMPARISON:  10/26/2022.      ACCESSION NUMBER(S):  [id number]      ORDERING CLINICIAN:  KHOI DE LA FUENTE      TECHNIQUE:  CT axial  images through the Brain were obtained without contrast.      FINDINGS:  There is no mass effect, hemorrhage, or infarct. The ventricles are  normal.  The visualized paranasal sinuses are clear.      Impression: Unremarkable exam.      Signed by: Forest Del Rosario 5/13/2024 9:06 AM  Dictation workstation:   [id number]      Physical Exam  Vitals reviewed.   Constitutional:       Appearance: Normal appearance.   HENT:      Head: Normocephalic and atraumatic.   Eyes:      Extraocular Movements: Extraocular movements intact.      Conjunctiva/sclera: Conjunctivae normal.   Cardiovascular:      Rate and Rhythm: Normal rate and regular rhythm.   Pulmonary:      Effort: Pulmonary effort is normal.      Breath sounds: Normal breath sounds. No wheezing, rhonchi or rales.   Abdominal:      General: Bowel sounds are normal.      Palpations: Abdomen is soft.      Tenderness: There is no abdominal tenderness.   Skin:     General: Skin is warm and dry.   Neurological:      Mental Status: He is alert and oriented to person, place, and time.      Sensory: Sensory deficit present.      Motor: Weakness present.         Relevant Results  Lab Results   Component Value Date    GLUCOSE 114 (H) 05/14/2024    CALCIUM 9.1 05/14/2024     05/14/2024    K 4.4 05/14/2024    CO2 24 05/14/2024     (H) 05/14/2024    BUN 17 05/14/2024    CREATININE 1.10 05/14/2024     Lab Results   Component Value Date    WBC 8.8 05/14/2024    HGB 12.5 (L) 05/14/2024    HCT 37.1 (L) 05/14/2024    MCV 96 05/14/2024     05/14/2024     Vascular US Carotid Artery Duplex Bilateral  Result Date: 5/14/2024  Preliminary Cardiology Report   Critical Result: There is greater than 70% stenosis of the right internal caroitd artery. Notification called to SHARLA Hicks on 5/14/2024 at 11:29:20 AM by Renetta Brown RVT.  PRELIMINARY CONCLUSIONS:  Right Carotid: Findings are consistent with greater than 70% stenosis of the right proximal internal  carotid artery. Laminar flow seen by color Doppler. Right external carotid artery appears patent with no evidence of stenosis. The right vertebral artery is patent with antegrade flow. No evidence of hemodynamically significant stenosis in the right subclavian artery. Left Carotid: Findings are consistent with 50 to 69% stenosis of the left proximal internal carotid artery. Left external carotid artery appears patent with no evidence of stenosis. The left vertebral artery is patent with antegrade flow. No evidence of hemodynamically significant stenosis in the left subclavian artery. Additional Findings: Technically difficult exam due to patient's positioning and deep inspiration.  Imaging & Doppler Findings: Right Plaque Morph: The proximal right internal carotid artery demonstrates heterogenous and calcified plaque. The proximal right external carotid artery demonstrates heterogenous plaque. The mid right common carotid artery demonstrates heterogenous plaque. The distal right common carotid artery demonstrates heterogenous and calcified plaque. Left Plaque Morph: The proximal left internal carotid artery demonstrates heterogenous and calcified plaque. The proximal left external carotid artery demonstrates heterogenous and calcified plaque. The distal left common carotid artery demonstrates heterogenous and calcified plaque.       MR angio neck wo IV contrast  Result Date: 5/13/2024  MRI BRAIN: 1. No acute infarct, acute hemorrhage, or intracranial mass effect. 2. Slight progression of mild supratentorial chronic ischemic white matter changes.   MRA HEAD AND NECK: 1. No evidence of major vessel occlusion or significant stenosis on MRA head. 2. There is a 60-65% stenosis of the left carotid bulb. There is a 75-80% stenosis of the right carotid bulb.   MACRO: None.   Signed by: Attila Raines 5/13/2024 11:08 PM Dictation workstation:   [id number]    XR arthrogram sacroiliac joints  Result Date:  5/13/2024  IMPRESSION: Fusion of the lumbar spine extending the bilateral iliac wing. Fusion of the upper ligamentous aspect of the sacroiliac joints. : ANTONINA   Transcribe Date/Time: May 13 2024 11:52A Dictated by : STEVEN ROSS MD  This examination was interpreted and the report reviewed and electronically signed by: STEVEN ROSS MD on May 13 2024 12:06PM  EST    CT head wo IV contrast  Result Date: 5/13/2024  Unremarkable exam.   Signed by: Forest Del Rosario 5/13/2024 9:06 AM Dictation workstation:   [id number]         Assessment/Plan      Principal Problem:    Dizziness    Right carotid artery stenosis  Seen on MRA  US carotid showed greater than 70% stenosis  Discussed with vascular surgery NP, will determine if CTA needed  Continue ASA  Vascular surgery following, appreciate recs    Dizziness rule out CVA  Reported dizziness, lightheadedness, headache  Has chronic neuropathy, decreased strength and sensation left hand  Admit to SDU  ASA/statin  permissive hypertension  lipid panel in AM  MRI/MRA results as above  Echocardiogram pending results  neurostroke assessment  consult neurology, appreciate recommendations     Hypertension  Hold antihypertensives for now  Permissive hypertension  PRN apresoline     Hyperlipidemia  Statin  Lipid panel in the morning     History of osteomyelitis of lumbar spine  Follows with Dr. Arriaga outpatient  Continue chronic Keflex     Chronic back pain  Resume home medications     Plan  Admit to stepdown unit for observation  Stroke workup  Vascular consulted for right carotid artery stenosis, appreciate recs  Consult neurology, appreciate recommendations  DVT prophylaxis: Lovenox  PT/OT  CBC and BMP in AM     CODE STATUS: Discussed with the patient and his wife.  Patient is a full code.              Yolie Ortega, APRN-CNP

## 2024-05-14 NOTE — CONSULTS
"Reason For Consult  Carotid stenosis    History Of Present Illness  Yanick Kruse is a 83 y.o. male presenting with a past medical history of hypertension, osteomyelitis of lumbar spine, hyperlipidemia, known carotid stenosis, chronic back pain.  Patient states that he has been treated for sinus issues since March 2024.  Around the same time, he developed mild symptoms of dizziness and lightheadedness.  On Saturday, May 11, 2024, the patient had an acute worsening of his lightheadedness/dizziness symptoms.  He states he sat down on the toilet to have a bowel movement and \"it was like a light switch went off\".  He states he lost consciousness for a brief period of time.  He was able to get back into bed but notes that the next morning he stood up to go urinate and was unable to get out of bed and had to lay back down.  He was admitted to the hospital and has had extensive workup including an MRA which showed 75 to 80% stenosis of the right carotid bulb and 60 to 65% stenosis of the left carotid bulb.  Awaiting carotid duplex.  The patient denies any focal neurologic symptoms during this episode.     Past Medical History  He has a past medical history of Carotid artery disease (CMS-HCC), Chronic back pain, Hyperlipidemia, Hypertension, Osteomyelitis of lumbar spine (Multi), Spinal stenosis, and Unspecified hearing loss, bilateral.    Surgical History  He has a past surgical history that includes Lumbar laminectomy (06/24/2014); MR angio head wo IV contrast (10/27/2022); and Spinal fusion.     Social History  He reports that he has quit smoking. He does not have any smokeless tobacco history on file. He reports that he does not drink alcohol and does not use drugs.    Family History  Family History   Problem Relation Name Age of Onset    Heart disease Mother      Stomach cancer Father          Allergies  Penicillins, Codeine, Naproxen, Vardenafil, Amoxicillin, and Sulfa (sulfonamide antibiotics)    Review of " "Systems    CONSTITUTIONAL: Denies weight loss, fever and chills.    HEENT: Denies changes in vision and hearing.    RESPIRATORY: Denies SOB and cough.    CV: Denies palpitations and CP.    GI: Denies abdominal pain, nausea, vomiting and diarrhea.    : Denies dysuria and urinary frequency.    MSK: Denies myalgia and joint pain.    SKIN: Denies rash and pruritus.    VASC: Denies claudication, ischemic rest pain, or open wounds or sores    NEUROLOGICAL: Denies headache.  Positive for dizziness, questionable syncopal episode/presyncopal episode    PSYCHIATRIC: Denies recent changes in mood. Denies anxiety and depression.     Physical Exam    General: Pt is alert and oriented x 3. Pleasant, conversive  HEENT: Head is atraumatic, normocephalic.   Cardiac: Normal S1-S2.  Regular rate and rhythm.  No murmurs.  Respiratory: Lungs clear to auscultation.  No adventitious sounds.  Abdomen: Soft, nondistended, nontender.  Bowel sounds x4 quadrants.  Pulse exam: Palpable brachial and radial pulses bilaterall.    easily palpable pedal pulses bilaterally.  Extremities: No significant edema noted.  Extremities are warm to the touch and normal in color.  No open wounds or sores.  Neuro: Moves all extremities spontaneously.  No focal deficits.   strength is 5+ bilaterally.  Dorsiflexion and plantarflexion are 5+ bilaterally.  Smile is symmetrical.  Tongue is midline.  Psych: Appropriate affect.  Answers questions appropriately.     Last Recorded Vitals  Blood pressure 151/71, pulse 62, temperature 36.4 °C (97.5 °F), temperature source Temporal, resp. rate 18, height 1.753 m (5' 9\"), weight 125 kg (275 lb 5.7 oz), SpO2 96%.    Relevant Results  Scheduled medications  aspirin, 325 mg, oral, Daily  atorvastatin, 40 mg, oral, Nightly  cephalexin, 500 mg, oral, BID  docusate sodium, 100 mg, oral, BID  enoxaparin, 40 mg, subcutaneous, Daily  fluticasone, 2 spray, Each Nostril, Daily  gabapentin, 300 mg, oral, Daily  gabapentin, 600 " mg, oral, Nightly  ketotifen, 1 drop, Both Eyes, BID  multivitamin, 1 tablet, oral, Daily  perflutren protein A microsphere, 0.5 mL, intravenous, Once in imaging  sulfur hexafluoride microsphr, 2 mL, intravenous, Once in imaging      Continuous medications     PRN medications  PRN medications: acetaminophen **OR** acetaminophen **OR** acetaminophen, acetaminophen **OR** acetaminophen **OR** acetaminophen, hydrALAZINE **FOLLOWED BY** [START ON 5/16/2024] hydrALAZINE, magnesium hydroxide, ondansetron **OR** ondansetron, oxyCODONE-acetaminophen, oxygen     MR brain wo IV contrast    Result Date: 5/13/2024  Interpreted By:  Attila Raines, STUDY: MR BRAIN WO IV CONTRAST; MR ANGIO HEAD WO IV CONTRAST; MR ANGIO NECK WO IV CONTRAST;  5/13/2024 10:24 pm; 5/13/2024 10:25 pm; 5/13/2024 10:26 pm   INDICATION: Signs/Symptoms:dizziness. Stroke protocol.   COMPARISON: CT head w/o contrast dated 05/13/2024, MRI brain 10/27/2022   ACCESSION NUMBER(S): [id number]; [id number]; [id number]   ORDERING CLINICIAN: SHANNAN KAUFMAN   TECHNIQUE: Multiplanar, multi-sequence images of the brain were obtained without contrast.   3D time-of-flight MR angiography of the head and neck was performed. The images were reviewed as source images and maximum intensity projections.   FINDINGS: MRI BRAIN:   Diffusion weighted images show no evidence of acute ischemic infarct.   Slight progression of mild periventricular and subcortical hemispheric T2/FLAIR white matter hyperintensities are most compatible with chronic small vessel ischemic disease.   There is no acute intraparenchymal hemorrhage, mass, mass-effect, or an extra-axial fluid collection.   The ventricular size and cerebral volume are age-concordant.   Normal morphology of midline structures. The craniovertebral junction is normal.   The orbits and globes are unremarkable. Bilateral lens extractions are noted.   The paranasal sinuses show no air-fluid levels or hemorrhage.   The  mastoid air cells are clear.     MRA HEAD: There is a fetal type right posterior cerebral artery. There is no hemodynamically significant intracranial stenosis or large vessel occlusion. There is no intracranial aneurysm.     MRA NECK: Source images are motion degraded.   Mild spin-dephasing artifact is noted at the carotid bulbs.   COMMON/INTERNAL CAROTID ARTERIES: There is a 60-65% stenosis of the left carotid bulb. There is a 75-80% stenosis of the right carotid bulb.   VERTEBRAL ARTERIES: The left vertebral artery is non dominant. No occlusion, hemodynamically significant stenosis, or dissection.       MRI BRAIN: 1. No acute infarct, acute hemorrhage, or intracranial mass effect. 2. Slight progression of mild supratentorial chronic ischemic white matter changes.   MRA HEAD AND NECK: 1. No evidence of major vessel occlusion or significant stenosis on MRA head. 2. There is a 60-65% stenosis of the left carotid bulb. There is a 75-80% stenosis of the right carotid bulb.   MACRO: None.   Signed by: Attila Raines 5/13/2024 11:08 PM Dictation workstation:   [id number]    MR angio head wo IV contrast    Result Date: 5/13/2024  Interpreted By:  Attila Raines, STUDY: MR BRAIN WO IV CONTRAST; MR ANGIO HEAD WO IV CONTRAST; MR ANGIO NECK WO IV CONTRAST;  5/13/2024 10:24 pm; 5/13/2024 10:25 pm; 5/13/2024 10:26 pm   INDICATION: Signs/Symptoms:dizziness. Stroke protocol.   COMPARISON: CT head w/o contrast dated 05/13/2024, MRI brain 10/27/2022   ACCESSION NUMBER(S): [id number]; [id number]; [id number]   ORDERING CLINICIAN: SHANNAN KAUFMAN   TECHNIQUE: Multiplanar, multi-sequence images of the brain were obtained without contrast.   3D time-of-flight MR angiography of the head and neck was performed. The images were reviewed as source images and maximum intensity projections.   FINDINGS: MRI BRAIN:   Diffusion weighted images show no evidence of acute ischemic infarct.   Slight progression of mild periventricular  and subcortical hemispheric T2/FLAIR white matter hyperintensities are most compatible with chronic small vessel ischemic disease.   There is no acute intraparenchymal hemorrhage, mass, mass-effect, or an extra-axial fluid collection.   The ventricular size and cerebral volume are age-concordant.   Normal morphology of midline structures. The craniovertebral junction is normal.   The orbits and globes are unremarkable. Bilateral lens extractions are noted.   The paranasal sinuses show no air-fluid levels or hemorrhage.   The mastoid air cells are clear.     MRA HEAD: There is a fetal type right posterior cerebral artery. There is no hemodynamically significant intracranial stenosis or large vessel occlusion. There is no intracranial aneurysm.     MRA NECK: Source images are motion degraded.   Mild spin-dephasing artifact is noted at the carotid bulbs.   COMMON/INTERNAL CAROTID ARTERIES: There is a 60-65% stenosis of the left carotid bulb. There is a 75-80% stenosis of the right carotid bulb.   VERTEBRAL ARTERIES: The left vertebral artery is non dominant. No occlusion, hemodynamically significant stenosis, or dissection.       MRI BRAIN: 1. No acute infarct, acute hemorrhage, or intracranial mass effect. 2. Slight progression of mild supratentorial chronic ischemic white matter changes.   MRA HEAD AND NECK: 1. No evidence of major vessel occlusion or significant stenosis on MRA head. 2. There is a 60-65% stenosis of the left carotid bulb. There is a 75-80% stenosis of the right carotid bulb.   MACRO: None.   Signed by: Attila Raines 5/13/2024 11:08 PM Dictation workstation:   [id number]    MR angio neck wo IV contrast    Result Date: 5/13/2024  Interpreted By:  Attila Raines, STUDY: MR BRAIN WO IV CONTRAST; MR ANGIO HEAD WO IV CONTRAST; MR ANGIO NECK WO IV CONTRAST;  5/13/2024 10:24 pm; 5/13/2024 10:25 pm; 5/13/2024 10:26 pm   INDICATION: Signs/Symptoms:dizziness. Stroke protocol.   COMPARISON: CT head w/o  contrast dated 05/13/2024, MRI brain 10/27/2022   ACCESSION NUMBER(S): [id number]; [id number]; [id number]   ORDERING CLINICIAN: SHANNAN KAUFMAN   TECHNIQUE: Multiplanar, multi-sequence images of the brain were obtained without contrast.   3D time-of-flight MR angiography of the head and neck was performed. The images were reviewed as source images and maximum intensity projections.   FINDINGS: MRI BRAIN:   Diffusion weighted images show no evidence of acute ischemic infarct.   Slight progression of mild periventricular and subcortical hemispheric T2/FLAIR white matter hyperintensities are most compatible with chronic small vessel ischemic disease.   There is no acute intraparenchymal hemorrhage, mass, mass-effect, or an extra-axial fluid collection.   The ventricular size and cerebral volume are age-concordant.   Normal morphology of midline structures. The craniovertebral junction is normal.   The orbits and globes are unremarkable. Bilateral lens extractions are noted.   The paranasal sinuses show no air-fluid levels or hemorrhage.   The mastoid air cells are clear.     MRA HEAD: There is a fetal type right posterior cerebral artery. There is no hemodynamically significant intracranial stenosis or large vessel occlusion. There is no intracranial aneurysm.     MRA NECK: Source images are motion degraded.   Mild spin-dephasing artifact is noted at the carotid bulbs.   COMMON/INTERNAL CAROTID ARTERIES: There is a 60-65% stenosis of the left carotid bulb. There is a 75-80% stenosis of the right carotid bulb.   VERTEBRAL ARTERIES: The left vertebral artery is non dominant. No occlusion, hemodynamically significant stenosis, or dissection.       MRI BRAIN: 1. No acute infarct, acute hemorrhage, or intracranial mass effect. 2. Slight progression of mild supratentorial chronic ischemic white matter changes.   MRA HEAD AND NECK: 1. No evidence of major vessel occlusion or significant stenosis on MRA head. 2. There is  a 60-65% stenosis of the left carotid bulb. There is a 75-80% stenosis of the right carotid bulb.   MACRO: None.   Signed by: Attila Raines 5/13/2024 11:08 PM Dictation workstation:   [id number]    CT head wo IV contrast    Result Date: 5/13/2024  Interpreted By:  Forest Del Rosario, STUDY: CT HEAD WO IV CONTRAST; 5/13/2024 8:51 am   INDICATION: Signs/Symptoms:dizziness.   COMPARISON: 10/26/2022.   ACCESSION NUMBER(S): [id number]   ORDERING CLINICIAN: KHOI DE LA FUENTE   TECHNIQUE: CT axial images through the Brain were obtained without contrast.   FINDINGS: There is no mass effect, hemorrhage, or infarct. The ventricles are normal.  The visualized paranasal sinuses are clear.       Unremarkable exam.   Signed by: oFrest Del Rosario 5/13/2024 9:06 AM Dictation workstation:   [id number]    XR shoulder left 2+ views    Result Date: 5/11/2024  * * *Final Report* * * DATE OF EXAM: May  9 2024  9:52AM   MYX   5252  -  XR SHLDR >/=3V AP/BLADIMIR AP/OTHR LT  / ACCESSION #  [id number] PROCEDURE REASON: multiple diagnoses      * * * * Physician Interpretation * * * *  EXAMINATION: Left shoulder HISTORY: Pain TECHNIQUE: 3 views RESULTS: There is spurring along the inferior aspect of the acromion process and at the AC joint.  Glenohumeral joint spaces maintained.   Spurring along the upper outer humeral head.  No acute findings or soft tissue calcifications.    IMPRESSION: Mild degenerative spurring otherwise negative exam : ANTONINA   Transcribe Date/Time: May 11 2024 11:10A Dictated by : FLORENCIA ZIMMER MD This examination was interpreted and the report reviewed and electronically signed by: FLORENCIA ZIMMER MD on May 11 2024 11:11AM  EST       Assessment/Plan   Carotid stenosis  Dizziness  Presyncopal episode  Hyperlipidemia    Discussed in detail with Dr. Portillo.    Carotid artery stenosis-patient had prior CT angiogram in 2022 which showed high-grade stenosis of the right internal carotid artery.  However, the  duplex that was completed around the same time was incongruent with these findings.  It appears that the patient has seen Dr. Ric Lyn at the [facility] for this issue and he recommends conservative management.  Last visit was January 24, 2024.  While we are awaiting carotid duplex, it does not appear that there is significant difference in the degree of stenosis based on MRA.  However, we will wait to give final recommendations once duplex has been completed.  Continue antiplatelet and statin.    2.  Dizziness-unclear etiology.  Neurology following.    3.  Presyncopal episode-could be vasovagal response as the patient was having a bowel movement during this episode.    4.  Hyperlipidemia-continue high intensity statin.      I spent 55 minutes in the professional and overall care of this patient.      CONSTANTINE Levine-CNP

## 2024-05-14 NOTE — NURSING NOTE
Assumed care of patient.   Patient in bed resting.  Bedside shift report received.  Heart monitor showing NSR with HR of 66.  Call light in reach.

## 2024-05-15 ENCOUNTER — DOCUMENTATION (OUTPATIENT)
Dept: OTOLARYNGOLOGY | Facility: CLINIC | Age: 84
End: 2024-05-15
Payer: MEDICARE

## 2024-05-15 DIAGNOSIS — I65.23 BILATERAL CAROTID ARTERY STENOSIS: Primary | ICD-10-CM

## 2024-05-15 DIAGNOSIS — R42 DIZZINESS: Primary | ICD-10-CM

## 2024-05-15 LAB
ANION GAP SERPL CALC-SCNC: 12 MMOL/L
BUN SERPL-MCNC: 19 MG/DL (ref 8–25)
CALCIUM SERPL-MCNC: 9.3 MG/DL (ref 8.5–10.4)
CHLORIDE SERPL-SCNC: 107 MMOL/L (ref 97–107)
CO2 SERPL-SCNC: 23 MMOL/L (ref 24–31)
CREAT SERPL-MCNC: 1.1 MG/DL (ref 0.4–1.6)
EGFRCR SERPLBLD CKD-EPI 2021: 67 ML/MIN/1.73M*2
ERYTHROCYTE [DISTWIDTH] IN BLOOD BY AUTOMATED COUNT: 12.8 % (ref 11.5–14.5)
GLUCOSE BLD MANUAL STRIP-MCNC: 106 MG/DL (ref 74–99)
GLUCOSE BLD MANUAL STRIP-MCNC: 128 MG/DL (ref 74–99)
GLUCOSE BLD MANUAL STRIP-MCNC: 134 MG/DL (ref 74–99)
GLUCOSE BLD MANUAL STRIP-MCNC: 99 MG/DL (ref 74–99)
GLUCOSE SERPL-MCNC: 112 MG/DL (ref 65–99)
HCT VFR BLD AUTO: 39.5 % (ref 41–52)
HGB BLD-MCNC: 13 G/DL (ref 13.5–17.5)
MCH RBC QN AUTO: 32.1 PG (ref 26–34)
MCHC RBC AUTO-ENTMCNC: 32.9 G/DL (ref 32–36)
MCV RBC AUTO: 98 FL (ref 80–100)
NRBC BLD-RTO: 0 /100 WBCS (ref 0–0)
PLATELET # BLD AUTO: 213 X10*3/UL (ref 150–450)
POTASSIUM SERPL-SCNC: 3.9 MMOL/L (ref 3.4–5.1)
RBC # BLD AUTO: 4.05 X10*6/UL (ref 4.5–5.9)
SODIUM SERPL-SCNC: 142 MMOL/L (ref 133–145)
WBC # BLD AUTO: 7.7 X10*3/UL (ref 4.4–11.3)

## 2024-05-15 PROCEDURE — 97116 GAIT TRAINING THERAPY: CPT | Mod: GP

## 2024-05-15 PROCEDURE — 2500000001 HC RX 250 WO HCPCS SELF ADMINISTERED DRUGS (ALT 637 FOR MEDICARE OP): Performed by: NURSE PRACTITIONER

## 2024-05-15 PROCEDURE — 2500000004 HC RX 250 GENERAL PHARMACY W/ HCPCS (ALT 636 FOR OP/ED): Performed by: NURSE PRACTITIONER

## 2024-05-15 PROCEDURE — 85027 COMPLETE CBC AUTOMATED: CPT | Performed by: NURSE PRACTITIONER

## 2024-05-15 PROCEDURE — 1210000001 HC SEMI-PRIVATE ROOM DAILY

## 2024-05-15 PROCEDURE — 99233 SBSQ HOSP IP/OBS HIGH 50: CPT | Performed by: NURSE PRACTITIONER

## 2024-05-15 PROCEDURE — 97110 THERAPEUTIC EXERCISES: CPT | Mod: GP

## 2024-05-15 PROCEDURE — 36415 COLL VENOUS BLD VENIPUNCTURE: CPT | Performed by: NURSE PRACTITIONER

## 2024-05-15 PROCEDURE — 82374 ASSAY BLOOD CARBON DIOXIDE: CPT | Performed by: NURSE PRACTITIONER

## 2024-05-15 PROCEDURE — 97530 THERAPEUTIC ACTIVITIES: CPT | Mod: GO,CO

## 2024-05-15 PROCEDURE — 82947 ASSAY GLUCOSE BLOOD QUANT: CPT

## 2024-05-15 PROCEDURE — 80048 BASIC METABOLIC PNL TOTAL CA: CPT | Performed by: NURSE PRACTITIONER

## 2024-05-15 PROCEDURE — 97535 SELF CARE MNGMENT TRAINING: CPT | Mod: GO,CO

## 2024-05-15 RX ADMIN — ASPIRIN 325 MG: 325 TABLET, COATED ORAL at 08:38

## 2024-05-15 RX ADMIN — KETOTIFEN FUMARATE 1 DROP: 0.25 SOLUTION/ DROPS OPHTHALMIC at 08:38

## 2024-05-15 RX ADMIN — KETOTIFEN FUMARATE 1 DROP: 0.25 SOLUTION/ DROPS OPHTHALMIC at 21:31

## 2024-05-15 RX ADMIN — DOCUSATE SODIUM 100 MG: 100 CAPSULE, LIQUID FILLED ORAL at 08:38

## 2024-05-15 RX ADMIN — FLUTICASONE PROPIONATE 2 SPRAY: 50 SPRAY, METERED NASAL at 08:38

## 2024-05-15 RX ADMIN — ATORVASTATIN CALCIUM 40 MG: 40 TABLET, FILM COATED ORAL at 21:12

## 2024-05-15 RX ADMIN — ENOXAPARIN SODIUM 40 MG: 40 INJECTION SUBCUTANEOUS at 08:38

## 2024-05-15 RX ADMIN — CEPHALEXIN 500 MG: 500 CAPSULE ORAL at 08:38

## 2024-05-15 RX ADMIN — CEPHALEXIN 500 MG: 500 CAPSULE ORAL at 21:12

## 2024-05-15 RX ADMIN — MULTIVITAMIN TABLET 1 TABLET: TABLET at 08:39

## 2024-05-15 RX ADMIN — GABAPENTIN 600 MG: 600 TABLET, FILM COATED ORAL at 21:12

## 2024-05-15 RX ADMIN — DOCUSATE SODIUM 100 MG: 100 CAPSULE, LIQUID FILLED ORAL at 21:12

## 2024-05-15 RX ADMIN — GABAPENTIN 300 MG: 300 CAPSULE ORAL at 08:38

## 2024-05-15 ASSESSMENT — PAIN - FUNCTIONAL ASSESSMENT
PAIN_FUNCTIONAL_ASSESSMENT: 0-10

## 2024-05-15 ASSESSMENT — COGNITIVE AND FUNCTIONAL STATUS - GENERAL
WALKING IN HOSPITAL ROOM: A LITTLE
TURNING FROM BACK TO SIDE WHILE IN FLAT BAD: A LITTLE
DRESSING REGULAR LOWER BODY CLOTHING: A LITTLE
DRESSING REGULAR LOWER BODY CLOTHING: A LITTLE
MOVING TO AND FROM BED TO CHAIR: A LITTLE
MOVING FROM LYING ON BACK TO SITTING ON SIDE OF FLAT BED WITH BEDRAILS: A LITTLE
DRESSING REGULAR UPPER BODY CLOTHING: A LITTLE
TOILETING: A LOT
CLIMB 3 TO 5 STEPS WITH RAILING: A LITTLE
PERSONAL GROOMING: A LITTLE
MOBILITY SCORE: 18
STANDING UP FROM CHAIR USING ARMS: A LITTLE
DAILY ACTIVITIY SCORE: 18
CLIMB 3 TO 5 STEPS WITH RAILING: A LOT
MOVING TO AND FROM BED TO CHAIR: A LITTLE
PERSONAL GROOMING: A LITTLE
TOILETING: A LOT
MOBILITY SCORE: 17
DAILY ACTIVITIY SCORE: 18
TURNING FROM BACK TO SIDE WHILE IN FLAT BAD: A LITTLE
MOVING FROM LYING ON BACK TO SITTING ON SIDE OF FLAT BED WITH BEDRAILS: A LITTLE
WALKING IN HOSPITAL ROOM: A LITTLE
STANDING UP FROM CHAIR USING ARMS: A LITTLE
HELP NEEDED FOR BATHING: A LITTLE
DRESSING REGULAR UPPER BODY CLOTHING: A LITTLE
HELP NEEDED FOR BATHING: A LITTLE

## 2024-05-15 ASSESSMENT — PAIN SCALES - GENERAL
PAINLEVEL_OUTOF10: 0 - NO PAIN

## 2024-05-15 ASSESSMENT — ACTIVITIES OF DAILY LIVING (ADL)
BATHING_LEVEL_OF_ASSISTANCE: MODERATE ASSISTANCE
BATHING_WHERE_ASSESSED: SITTING SINKSIDE
HOME_MANAGEMENT_TIME_ENTRY: 29

## 2024-05-15 NOTE — PROGRESS NOTES
PT/OT skilled patient moderate, [facility] 11. Spoke to patient and son at bedside. Pt is agreeable to go to SNF. Gave SNF list. Choices were given 1. [facility] 2. [facility] 3. [facility]. Will send via careport. Pt will need precert.     DISCHARGE PLAN TO GO TO SNF, PENDING ACCEPTANCE AND PRECERT.     [id number] UPDATE: [facility] is OON. [facility] accepted. Precert started.   DISCHARGE PLAN TO GO TO [facility], PRECERT PENDING.            05/15/24 3945   Current Planned Discharge Disposition   Current Planned Discharge Disposition SNF

## 2024-05-15 NOTE — PROGRESS NOTES
Physical Therapy    Physical Therapy Treatment    Patient Name: Yanick Kruse  MRN: [id number]  Today's Date: 5/15/2024  Time Calculation  Start Time: 1320  Stop Time: 1346  Time Calculation (min): 26 min    Assessment/Plan   PT Assessment  PT Assessment Results: Decreased strength, Decreased range of motion, Decreased endurance, Impaired balance, Decreased mobility, Decreased coordination, Decreased safety awareness, Impaired vision, Impaired hearing, Impaired sensation, Obesity  Rehab Prognosis: Good  Evaluation/Treatment Tolerance: Patient tolerated treatment well  End of Session Communication: Bedside nurse  Assessment Comment: increased gait distance today;MIN A using rolling walker; mild shakiness toward end of gait training.  End of Session Patient Position: Bed, 3 rail up, Alarm on (call button in reach)  PT Plan  Inpatient/Swing Bed or Outpatient: Inpatient  PT Plan  Treatment/Interventions: Bed mobility, Transfer training, Gait training, Stair training, Balance training, Strengthening, Endurance training, Range of motion, Therapeutic exercise, Therapeutic activity  PT Plan: Skilled PT  PT Frequency: 4 times per week  PT Discharge Recommendations: Moderate intensity level of continued care  Equipment Recommended upon Discharge: Wheeled walker  PT Recommended Transfer Status: Assist x2, Other (comment) (for safety at this time)  PT - OK to Discharge: Yes      General Visit Information:   PT  Visit  PT Received On: 05/15/24  General  Reason for Referral: dizziness; s/p falls; impaired mobility  Prior to Session Communication: Bedside nurse  Patient Position Received: Up in chair  General Comment: pt alert and cooperative; pt very talkative and required VC to re-direct    Subjective   Precautions:  Precautions  Hearing/Visual Limitations: wears glasses, [place]  Medical Precautions: Fall precautions  Precautions Comment: instructed pt in safety techniques during mobility    Vital Signs:  Vital Signs  Heart Rate:  Was seen in clinic with Dr Gabby Ojeda:  Anemia labs today  Ok to tx today  Ldc in 4 weeks    Diagnosis: Prostate cancer (CMS/HCC    Regimen: DEGARELIX 240 MG SQ [id number] X 1 THEN 80 MG SQ D43MLGD: PROSTATE, METASTATIC      Cycle/Day: c6d1  Is this a C1D1 appt?  No    Dr Gabby Ojeda is supervising clinician today.    ECOG:   ECOG [12/15/21 1207]   ECOG Performance Status 1       Review and verified Advanced Directives: No, patient has no interest in completing Advanced Directives at this time    Verified if patient has state DNR bracelet on: No; Full Code    Nursing Assessment:   A focused nursing assessment addressing the toxicity of chemotherapy was performed and the patient reports the following:  Nausea: NO  Vomiting: NO  Fever: NO  Chills: reported  Other signs of infection: NO  Bleeding: NO  Mucositis: NO  Diarrhea: NO  Constipation: NO  Anorexia: NO  Dysuria: NO  Urinary Bleeding: NO  Cough: NO  Shortness of Breath: NO  Fatigue/Weakness: YES  Numbness/Tingling: NO  Other Neuropathies: NO  Edema: NO  Rash: leg ulcer on antibiotics  Hand/Foot Syndrome: NO  Anxiety/Depression/Insomnia: NO  Pain: YES 5  Needing wheelchair    Discussed low sodium level with Dr Gabby Ojeda: no intervention ordered     Pre-Treatment: Patient has valid pre-authorization  VS completed  Lab results checked - MD notified; cbc cmp  I have reviewed the following with the patient:  Name of chemo drug, duration and route of infusion, and reportable infusion-related symptoms.    Treatment: SubQ/IM Injection: See injection record for documentation    Post Treatment: Treatment tolerated well; no adverse reaction    Does the Patient have a central line?  no    Transfusion: Not needed    Integrative Medicine: No    Oral Chemotherapy: No    Education: Instructed on lab results and plan of care    Next appointment scheduled:   Future Appointments   Date Time Provider Department Center   12/17/2021  1:50 PM Shravan Gonzalez MD  83  SpO2:  (98% on room air)    Objective   Pain:  Pain Assessment  Pain Assessment:  (0/10)    Cognition:  Cognition  Overall Cognitive Status: Within Functional Limits    Coordination:  Coordination Comment: short B step length    Postural Control:  Postural Control  Posture Comment: mild flexed forward trunk    Activity Tolerance:  Activity Tolerance  Endurance:  (GOOD activity tolerance)    Treatments:  Therapeutic Exercise Performed:  (B LE AROM therex: AP, GS, QS, HS, ABD, LAQ, calf raises and hip flexion x 15 reps)      Bed Mobility:  sit to supine with MIN A to B LE. pt able to scoot over on bed.      Ambulation/Gait Training Performed:  pt amb 40' x 1 using rolling walker with MIN A; pt presented with mild flexed knees and short B step length noted; frequent VC given to stay inside base of RW. mild shakiness toward end of session      Transfer:  sit <-> stand with MIN A. VC for safe hand placement      Other Activity Performed:  pt asked to use bathroom; pt amb additional 8' x 2 using RW and MIN A: pt stood over commode with RW. CGA to steady in standing. SUPERVISION with [facility]. pt requested back to supine after tolieting    Outcome Measures:  [facility] Basic Mobility  Turning from your back to your side while in a flat bed without using bedrails: A little  Moving from lying on your back to sitting on the side of a flat bed without using bedrails: A little  Moving to and from bed to chair (including a wheelchair): A little  Standing up from a chair using your arms (e.g. wheelchair or bedside chair): A little  To walk in hospital room: A little  Climbing 3-5 steps with railing: A lot  Basic Mobility - Total Score: 17           Encounter Problems       Encounter Problems (Active)       Balance       STG - Maintains dynamic standing balance with upper extremity support (Progressing)       Start:  05/14/24    Expected End:  06/11/24       INTERVENTIONS:  1. Practice standing with minimal support.  2. Educate  ELKURO K   12/23/2021  2:00 PM Rupinder Almendarez MD [facility]   1/3/2022  2:00 PM [facility]   1/12/2022 11:30 AM [facility]   1/12/2022 12:00 PM Gabby Ojeda MD [address]     Patient instructed to call the office with any questions or concerns.    Patient Discharged: per wheelchair, with family member   patient about standing tolerance.  3. Educate patient about independence with gait, transfers, and ADL's.  4. Educate patient about use of assistive device.  5. Educate patient about self-directed care.            Mobility       STG - Patient will ambulate 300 ft, + turns, LRAD, close supervision of 1 (Progressing)       Start:  05/14/24    Expected End:  06/11/24            pt ascends/descends 12 steps with 1 handrail, reciprocally, close supervision of 1 (Progressing)       Start:  05/14/24    Expected End:  06/11/24               PT Transfers       STG - Patient to transfer to and from sit to supine mod I (Progressing)       Start:  05/14/24    Expected End:  06/11/24            STG - Patient will transfer sit to and from stand distant supervision of 1 (Progressing)       Start:  05/14/24    Expected End:  06/11/24               Safety       LTG - Patient will demonstrate safety requirements appropriate to situation/environment (Progressing)       Start:  05/14/24    Expected End:  06/11/24

## 2024-05-15 NOTE — CARE PLAN
The patient's goals for the shift include feel better    The clinical goals for the shift include safety    Problem: Safety  Goal: Patient will be injury free during hospitalization  Outcome: Progressing     Problem: Pain  Goal: My pain/discomfort is manageable  Outcome: Progressing

## 2024-05-15 NOTE — PROGRESS NOTES
Yanick Kruse is a 83 y.o. male on day 2 of admission presenting with Dizziness.      Subjective  Patient in no distress on exam  Denies amaurosis fugax unilateral weakness or difficulty with speech  Discussed plans for follow-up    Objective        Last Recorded Vitals      5/14/2024     1:01 PM 5/14/2024     4:15 PM 5/14/2024     7:44 PM 5/14/2024    11:42 PM 5/15/2024     3:50 AM 5/15/2024     6:40 AM 5/15/2024     7:53 AM   Vitals   Systolic 164 152 132 142 154  152   Diastolic 70 63 64 65 65  65   Heart Rate 77  64       Temp  36.5 °C (97.7 °F) 36.8 °C (98.2 °F) 36.3 °C (97.3 °F) 36 °C (96.8 °F)  36.5 °C (97.7 °F)   Resp  20 20       Weight (lb)      269.4    BMI      39.78 kg/m2    BSA (m2)      2.44 m2        Imaging  Vascular US Carotid Artery Duplex Bilateral    Result Date: 5/14/2024  Preliminary Cardiology [place] [address]            Phone [phone]      [facility]  [facility]  Patient Name:     YANICK Moore Physician: [id number] Lawrence Jacobo MD,                   JENNIFER                            RPVI Study Date:       5/14/2024        Ordering Provider: [id number] SHANNAN KAUFMAN MRN/PID:          [id number]         Fellow: Accession#:       [id number]     Technologist:      Renetta Brown RVT YOB: 1940        Technologist 2: Gender:           M                Encounter#:        [id number] Admission Status: Inpatient        Location           [facility]                                    Performed:  Diagnosis/ICD: Occlusion and stenosis of right carotid artery-I65.21 CPT Codes:     63232 Cerebrovascular Carotid Duplex scan complete  **CRITICAL RESULT** Critical Result: There is greater than 70% stenosis of the right internal caroitd artery. Notification called to SHARLA Hicks on 5/14/2024 at 11:29:20 AM by Renetta Brown RVT.   PRELIMINARY CONCLUSIONS:  Right Carotid: Findings are consistent with greater than 70% stenosis of the right proximal internal carotid artery. Laminar flow seen by color Doppler. Right external carotid artery appears patent with no evidence of stenosis. The right vertebral artery is patent with antegrade flow. No evidence of hemodynamically significant stenosis in the right subclavian artery. Left Carotid: Findings are consistent with 50 to 69% stenosis of the left proximal internal carotid artery. Left external carotid artery appears patent with no evidence of stenosis. The left vertebral artery is patent with antegrade flow. No evidence of hemodynamically significant stenosis in the left subclavian artery. Additional Findings: Technically difficult exam due to patient's positioning and deep inspiration.  Imaging & Doppler Findings: Right Plaque Morph: The proximal right internal carotid artery demonstrates heterogenous and calcified plaque. The proximal right external carotid artery demonstrates heterogenous plaque. The mid right common carotid artery demonstrates heterogenous plaque. The distal right common carotid artery demonstrates heterogenous and calcified plaque. Left Plaque Morph: The proximal left internal carotid artery demonstrates heterogenous and calcified plaque. The proximal left external carotid artery demonstrates heterogenous and calcified plaque. The distal left common carotid artery demonstrates heterogenous and calcified plaque.   Right                        Left   PSV      EDV                PSV       cm/s           CCA P    116 cm/s 103 cm/s           CCA D    110 cm/s 244 cm/s 58 cm/s   ICA P    189 cm/s 30 cm/s 126 cm/s 15 cm/s   ICA M    81 cm/s  19 cm/s 75 cm/s  14 cm/s   ICA D    69 cm/s  17 cm/s 155 cm/s            ECA     114 cm/s 62 cm/s  16 cm/s Vertebral  38 cm/s  9 cm/s 103 cm/s         Subclavian 100 cm/s                Right Left ICA/CCA Ratio  2.4  1.7   VASCULAR  PRELIMINARY REPORT completed by Renetta Brown T on 5/14/2024 at 11:31:34 AM  ** Final **     MR brain wo IV contrast    Result Date: 5/13/2024  Interpreted By:  Attila Raines, STUDY: MR BRAIN WO IV CONTRAST; MR ANGIO HEAD WO IV CONTRAST; MR ANGIO NECK WO IV CONTRAST;  5/13/2024 10:24 pm; 5/13/2024 10:25 pm; 5/13/2024 10:26 pm   INDICATION: Signs/Symptoms:dizziness. Stroke protocol.   COMPARISON: CT head w/o contrast dated 05/13/2024, MRI brain 10/27/2022   ACCESSION NUMBER(S): [id number]; [id number]; [id number]   ORDERING CLINICIAN: SHANNAN KAUFMAN   TECHNIQUE: Multiplanar, multi-sequence images of the brain were obtained without contrast.   3D time-of-flight MR angiography of the head and neck was performed. The images were reviewed as source images and maximum intensity projections.   FINDINGS: MRI BRAIN:   Diffusion weighted images show no evidence of acute ischemic infarct.   Slight progression of mild periventricular and subcortical hemispheric T2/FLAIR white matter hyperintensities are most compatible with chronic small vessel ischemic disease.   There is no acute intraparenchymal hemorrhage, mass, mass-effect, or an extra-axial fluid collection.   The ventricular size and cerebral volume are age-concordant.   Normal morphology of midline structures. The craniovertebral junction is normal.   The orbits and globes are unremarkable. Bilateral lens extractions are noted.   The paranasal sinuses show no air-fluid levels or hemorrhage.   The mastoid air cells are clear.     MRA HEAD: There is a fetal type right posterior cerebral artery. There is no hemodynamically significant intracranial stenosis or large vessel occlusion. There is no intracranial aneurysm.     MRA NECK: Source images are motion degraded.   Mild spin-dephasing artifact is noted at the carotid bulbs.   COMMON/INTERNAL CAROTID ARTERIES: There is a 60-65% stenosis of the left carotid bulb. There is a 75-80% stenosis of the right  carotid bulb.   VERTEBRAL ARTERIES: The left vertebral artery is non dominant. No occlusion, hemodynamically significant stenosis, or dissection.       MRI BRAIN: 1. No acute infarct, acute hemorrhage, or intracranial mass effect. 2. Slight progression of mild supratentorial chronic ischemic white matter changes.   MRA HEAD AND NECK: 1. No evidence of major vessel occlusion or significant stenosis on MRA head. 2. There is a 60-65% stenosis of the left carotid bulb. There is a 75-80% stenosis of the right carotid bulb.   MACRO: None.   Signed by: Attila Raines 5/13/2024 11:08 PM Dictation workstation:   [id number]    MR angio head wo IV contrast    Result Date: 5/13/2024  Interpreted By:  Attila Raines, STUDY: MR BRAIN WO IV CONTRAST; MR ANGIO HEAD WO IV CONTRAST; MR ANGIO NECK WO IV CONTRAST;  5/13/2024 10:24 pm; 5/13/2024 10:25 pm; 5/13/2024 10:26 pm   INDICATION: Signs/Symptoms:dizziness. Stroke protocol.   COMPARISON: CT head w/o contrast dated 05/13/2024, MRI brain 10/27/2022   ACCESSION NUMBER(S): [id number]; [id number]; [id number]   ORDERING CLINICIAN: SHANNAN KAUFMAN   TECHNIQUE: Multiplanar, multi-sequence images of the brain were obtained without contrast.   3D time-of-flight MR angiography of the head and neck was performed. The images were reviewed as source images and maximum intensity projections.   FINDINGS: MRI BRAIN:   Diffusion weighted images show no evidence of acute ischemic infarct.   Slight progression of mild periventricular and subcortical hemispheric T2/FLAIR white matter hyperintensities are most compatible with chronic small vessel ischemic disease.   There is no acute intraparenchymal hemorrhage, mass, mass-effect, or an extra-axial fluid collection.   The ventricular size and cerebral volume are age-concordant.   Normal morphology of midline structures. The craniovertebral junction is normal.   The orbits and globes are unremarkable. Bilateral lens extractions are noted.    The paranasal sinuses show no air-fluid levels or hemorrhage.   The mastoid air cells are clear.     MRA HEAD: There is a fetal type right posterior cerebral artery. There is no hemodynamically significant intracranial stenosis or large vessel occlusion. There is no intracranial aneurysm.     MRA NECK: Source images are motion degraded.   Mild spin-dephasing artifact is noted at the carotid bulbs.   COMMON/INTERNAL CAROTID ARTERIES: There is a 60-65% stenosis of the left carotid bulb. There is a 75-80% stenosis of the right carotid bulb.   VERTEBRAL ARTERIES: The left vertebral artery is non dominant. No occlusion, hemodynamically significant stenosis, or dissection.       MRI BRAIN: 1. No acute infarct, acute hemorrhage, or intracranial mass effect. 2. Slight progression of mild supratentorial chronic ischemic white matter changes.   MRA HEAD AND NECK: 1. No evidence of major vessel occlusion or significant stenosis on MRA head. 2. There is a 60-65% stenosis of the left carotid bulb. There is a 75-80% stenosis of the right carotid bulb.   MACRO: None.   Signed by: Attila Raines 5/13/2024 11:08 PM Dictation workstation:   [id number]    MR angio neck wo IV contrast    Result Date: 5/13/2024  Interpreted By:  Attila Raines, STUDY: MR BRAIN WO IV CONTRAST; MR ANGIO HEAD WO IV CONTRAST; MR ANGIO NECK WO IV CONTRAST;  5/13/2024 10:24 pm; 5/13/2024 10:25 pm; 5/13/2024 10:26 pm   INDICATION: Signs/Symptoms:dizziness. Stroke protocol.   COMPARISON: CT head w/o contrast dated 05/13/2024, MRI brain 10/27/2022   ACCESSION NUMBER(S): [id number]; [id number]; [id number]   ORDERING CLINICIAN: SHANNAN KAUFMAN   TECHNIQUE: Multiplanar, multi-sequence images of the brain were obtained without contrast.   3D time-of-flight MR angiography of the head and neck was performed. The images were reviewed as source images and maximum intensity projections.   FINDINGS: MRI BRAIN:   Diffusion weighted images show no evidence of  acute ischemic infarct.   Slight progression of mild periventricular and subcortical hemispheric T2/FLAIR white matter hyperintensities are most compatible with chronic small vessel ischemic disease.   There is no acute intraparenchymal hemorrhage, mass, mass-effect, or an extra-axial fluid collection.   The ventricular size and cerebral volume are age-concordant.   Normal morphology of midline structures. The craniovertebral junction is normal.   The orbits and globes are unremarkable. Bilateral lens extractions are noted.   The paranasal sinuses show no air-fluid levels or hemorrhage.   The mastoid air cells are clear.     MRA HEAD: There is a fetal type right posterior cerebral artery. There is no hemodynamically significant intracranial stenosis or large vessel occlusion. There is no intracranial aneurysm.     MRA NECK: Source images are motion degraded.   Mild spin-dephasing artifact is noted at the carotid bulbs.   COMMON/INTERNAL CAROTID ARTERIES: There is a 60-65% stenosis of the left carotid bulb. There is a 75-80% stenosis of the right carotid bulb.   VERTEBRAL ARTERIES: The left vertebral artery is non dominant. No occlusion, hemodynamically significant stenosis, or dissection.       MRI BRAIN: 1. No acute infarct, acute hemorrhage, or intracranial mass effect. 2. Slight progression of mild supratentorial chronic ischemic white matter changes.   MRA HEAD AND NECK: 1. No evidence of major vessel occlusion or significant stenosis on MRA head. 2. There is a 60-65% stenosis of the left carotid bulb. There is a 75-80% stenosis of the right carotid bulb.   MACRO: None.   Signed by: Attila Raines 5/13/2024 11:08 PM Dictation workstation:   [id number]       Relevant Results  Results for orders placed or performed during the hospital encounter of 05/13/24 (from the past 24 hour(s))   POCT GLUCOSE   Result Value Ref Range    POCT Glucose 138 (H) 74 - 99 mg/dL   POCT GLUCOSE   Result Value Ref Range    POCT  Glucose 115 (H) 74 - 99 mg/dL   CBC   Result Value Ref Range    WBC 7.7 4.4 - 11.3 x10*3/uL    nRBC 0.0 0.0 - 0.0 /100 WBCs    RBC 4.05 (L) 4.50 - 5.90 x10*6/uL    Hemoglobin 13.0 (L) 13.5 - 17.5 g/dL    Hematocrit 39.5 (L) 41.0 - 52.0 %    MCV 98 80 - 100 fL    MCH 32.1 26.0 - 34.0 pg    MCHC 32.9 32.0 - 36.0 g/dL    RDW 12.8 11.5 - 14.5 %    Platelets 213 150 - 450 x10*3/uL   Basic Metabolic Panel   Result Value Ref Range    Glucose 112 (H) 65 - 99 mg/dL    Sodium 142 133 - 145 mmol/L    Potassium 3.9 3.4 - 5.1 mmol/L    Chloride 107 97 - 107 mmol/L    Bicarbonate 23 (L) 24 - 31 mmol/L    Urea Nitrogen 19 8 - 25 mg/dL    Creatinine 1.10 0.40 - 1.60 mg/dL    eGFR 67 >60 mL/min/1.73m*2    Calcium 9.3 8.5 - 10.4 mg/dL    Anion Gap 12 <=19 mmol/L   POCT GLUCOSE   Result Value Ref Range    POCT Glucose 128 (H) 74 - 99 mg/dL     Physical Exam     General: Pt is alert and oriented x 3.   HEENT: Head is atraumatic, normocephalic.  Smile symmetric  Cardiac:  Regular rate and rhythm.  No murmurs.  Respiratory: Lungs clear to auscultation.  No adventitious sounds.  Abdomen: Soft, nondistended, nontender.  Bowel sounds x4 quadrants.  Pulse exam: Palpable brachial and radial pulses bilaterall.    easily palpable pedal pulses bilaterally.  Extremities: No significant edema noted.  Extremities are warm to the touch and normal in color.  No open wounds or sores.  Neuro: Moves all extremities spontaneously.  No focal deficits.   strength is 5+ bilaterally.  Dorsiflexion and plantarflexion are 5+ bilaterally.  Smile is symmetrical.  Tongue is midline.  Psych: Appropriate affect.  Answers questions appropriately.      Assessment/Plan   Carotid stenosis  Dizziness  Presyncopal episode  Hyperlipidemia     Discussed in detail with Dr. Portillo.     Carotid artery stenosis-patient had prior CT angiogram in 2022 which showed high-grade stenosis of the right internal carotid artery.  However, the duplex that was completed around the same  time was incongruent with these findings.  It appears that the patient has seen Dr. Ric Lyn at the [facility] for this issue and he recommends conservative management.  Last visit was January 24, 2024.  While we are awaiting carotid duplex, it does not appear that there is significant difference in the degree of stenosis based on MRA.  Carotid duplex reviewed by Dr. Portillo on 5/15.  Dr. Portillo stated that the right is less than 70%, ratio is 2.4 and diastolic is less than 100.  Advised follow-up with our service or with [facility] vascular surgeon, discussed with patient.  He is to follow-up in 3 months.  Follow-up orders placed.     2.  Dizziness-unclear etiology.  Neurology following.     3.  Presyncopal episode-could be vasovagal response as the patient was having a bowel movement during this episode.     4.  Hyperlipidemia-continue high intensity statin.

## 2024-05-15 NOTE — NURSING NOTE
Received report from SDU Nurse Lynne.    1830H:  Patient arrived on the floor per wheelchair from SDU.  No distress or discomfort noted.  Oriented to room.  All needs attended.  Safety measures ensured and call light in reach.   Well appearing, alert, acyanotic, Cushing's facies, NAD

## 2024-05-15 NOTE — PROGRESS NOTES
Occupational Therapy    OT Treatment    Patient Name: Yanick Kruse  MRN: [id number]  Today's Date: 5/15/2024  Time Calculation  Start Time: 1500  Stop Time: 1539  Time Calculation (min): 39 min         Assessment:  OT Assessment: Pt very pleasant highly motivated progresisng with established POC.  Will continue with skilled therapeutic  OT intervention to address remaining deficits  Prognosis: Good  Barriers to Discharge: None  Evaluation/Treatment Tolerance: Patient tolerated treatment well (no c/o dizziness)  Medical Staff Made Aware: Yes  End of Session Communication: Bedside nurse  End of Session Patient Position: Up in chair, Alarm off, caregiver present (call light in reach Pt demonstrate use aware to request assistance with all transfers all needs met.)  OT Assessment Results: Decreased ADL status, Decreased IADLs, Decreased functional mobility, Decreased endurance  Prognosis: Good  Barriers to Discharge: None  Evaluation/Treatment Tolerance: Patient tolerated treatment well (no c/o dizziness)  Medical Staff Made Aware: Yes  Strengths: Ability to acquire knowledge, Attitude of self, Support of Caregivers, Support of extended family/friends, Premorbid level of function  Barriers to Participation: Comorbidities  Plan:  Treatment Interventions: ADL retraining, Functional transfer training, Endurance training, Patient/family training, Equipment evaluation/education, Compensatory technique education  OT Frequency: 4 times per week  Equipment Recommended upon Discharge: Wheeled walker  OT Recommended Transfer Status: Minimal assist, Assist of 1  OT - OK to Discharge: Yes  Treatment Interventions: ADL retraining, Functional transfer training, Endurance training, Patient/family training, Equipment evaluation/education, Compensatory technique education    Subjective   Previous Visit Info:  OT Last Visit  OT Received On: 05/15/24  General:  General  Reason for Referral: dizziness; s/p falls; impaired  mobility  Referred By: Yolie Ortega CNP  Missed Visit: No  Family/Caregiver Present: Yes  Caregiver Feedback: son  Prior to Session Communication: Bedside nurse  Patient Position Received: Bed, 3 rail up, Alarm on  Preferred Learning Style: verbal, visual  General Comment: Confered with NSG.  Pt agreeable to skilled therapeutic intervneiton.  Very social  Precautions:  Hearing/Visual Limitations: wears glasses, [place]  Medical Precautions: Fall precautions  Vital Signs:     Pain:  Pain Assessment  Pain Assessment: 0-10  Pain Score: 0 - No pain    Objective    Cognition:  Cognition  Overall Cognitive Status: Within Functional Limits  Orientation Level: Oriented X4  Coordination:  Movements are Fluid and Coordinated: Yes  Activities of Daily Living:   UE Bathing  UE Bathing Level of Assistance: Setup  UE Bathing Where Assessed: Sitting sinkside  UE Bathing Comments: sponge bathing    LE Bathing  LE Bathing Level of Assistance: Moderate assistance  LE Bathing Where Assessed: Sitting sinkside  LE Bathing Comments: Pt sponge bathing instructed LH sponge for home going ,stance phase at RW pt wash maikel area/buttocks    UE Dressing  UE Dressing Level of Assistance: Setup  UE Dressing Where Assessed: Chair  UE Dressing Comments: Pt doff/don hospital gown    LE Dressing  LE Dressing: Yes  LE Dressing Adaptive Equipment: Reacher, Sock aide  Pants Level of Assistance: Contact guard (Pt use LH reacher thread shorts BLE, pull to waist RW stance)  Sock Level of Assistance: Setup (Pt doff/don socks LH reacher sock aid use with self setup to device)  LE Dressing Where Assessed: Chair  LE Dressing Comments: Pt efficient with use as he uses at home    Toileting  Toileting Level of Assistance: Contact guard  Where Assessed: Toilet, Bedside commode  Toileting Comments: Pt adjust clothing prior/after  Functional Standing Tolerance:  Time: 2 minutes  Activity: stance phase ADL skills  Functional Standing Tolerance Comments: unilateral  hand support  Bed Mobility/Transfers: Bed Mobility  Bed Mobility: Yes  Bed Mobility 1  Bed Mobility 1: Supine to sitting  Level of Assistance 1: Contact guard  Bed Mobility Comments 1: bed to neutral instructed Pt with log roll technique/side transfer bar use and to exhale upon exertrion no adverse reaction reported  Bed Mobility 2  Bed Mobility  2: Scooting  Level of Assistance 2: Modified independent  Bed Mobility Comments 2: to edge of bed    Transfers  Transfer: Yes  Transfer 1  Transfer From 1: Bed to  Transfer to 1: Stand  Technique 1: Sit to stand  Transfer Device 1: Walker  Transfer Level of Assistance 1: Minimum assistance  Trials/Comments 1: vc hand placement  Transfers 2  Transfer From 2: Stand to  Transfer to 2: Chair with arms  Technique 2: Stand to sit  Transfer Device 2: Walker  Transfer Level of Assistance 2: Minimum assistance  Trials/Comments 2: vc hand placement    Toilet Transfers  Toilet Transfer From: Rolling walker  Toilet Transfer Type: To  Toilet Transfer to: Standard bedside commode (atop toilet grab bar use)  Toilet Transfer Technique: Ambulating  Toilet Transfers: Minimal assistance  Toilet Transfers Comments: vc hand placement with transition  Tub Transfers  Tub Transfers Comments: Recommendation TTB with acquisition optioins provided    Functional Mobility:  Functional Mobility  Functional Mobility Performed: Yes  Functional Mobility 1  Surface 1: Level tile  Device 1: Rolling walker  Functional Mobility Support Devices: Gait belt  Assistance 1: Minimum assistance  Comments 1: 15' to include doorway, turns, backing    Outcome Measures:[facility] Daily Activity  Putting on and taking off regular lower body clothing: A little  Bathing (including washing, rinsing, drying): A little  Putting on and taking off regular upper body clothing: A little  Toileting, which includes using toilet, bedpan or urinal: A lot  Taking care of personal grooming such as brushing teeth: A little  Eating Meals:  None  Daily Activity - Total Score: 18    Education Documentation  ADL Training, taught by ROCHELLE Ferreira at 5/15/2024  3:56 PM.  Learner: Patient  Readiness: Acceptance  Method: Explanation, Demonstration, Teach-back  Response: Verbalizes Understanding, Demonstrated Understanding, Needs Reinforcement  Comment: Instructed pt with asaptive ADL Skills, safety in transfers, compensatory strategies, energy conservation principles    Education Comments  No comments found.        OP EDUCATION:       Goals:  Encounter Problems       Encounter Problems (Active)       Bathing       STG - Patient will bathe UB/LB with close supervision and AE (Progressing)       Start:  05/14/24    Expected End:  06/14/24               Dressing Upper Extremities       LTG - Patient will complete upper body dressing independent (Progressing)       Start:  05/14/24    Expected End:  06/14/24               Dressings Lower Extremities       LTG - Patient will dress lower body with close supervision and AE (Progressing)       Start:  05/14/24    Expected End:  06/14/24               Functional Mobility       Perform functional mobility to and from the bathroom independent with 2ww (Progressing)       Start:  05/14/24    Expected End:  06/14/24               Grooming       STG - Patient completes grooming independent (Progressing)       Start:  05/14/24    Expected End:  06/14/24               OT Transfers       perform  all functional transfers independent with 2ww (Progressing)       Start:  05/14/24    Expected End:  06/14/24               Therapeutic Activity       Perform BUE exercise with close supervision (Progressing)       Start:  05/14/24    Expected End:  06/14/24               Toileting       LTG - Patient will complete daily toileting tasks independent with 2ww (Progressing)       Start:  05/14/24    Expected End:  06/14/24

## 2024-05-15 NOTE — NURSING NOTE
Assumed care of patient.   Bedside shift report received.  Patient in bed resting.   Call light in reach.

## 2024-05-15 NOTE — PROGRESS NOTES
Yanick Kruse is a 83 y.o. male on day 2 of admission presenting with Dizziness.      Subjective   Patient seen and examined. Awake/alert/oriented. Denies dizziness or headache at this time. Denies chest pain, shortness of breath, fevers, chills, nausea, or vomiting. No abdominal discomfort.          Objective     Last Recorded Vitals  /60 (BP Location: Right arm, Patient Position: Sitting)   Pulse 64   Temp 36.4 °C (97.5 °F) (Temporal)   Resp 21   Wt 122 kg (269 lb 6.4 oz)   SpO2 98%   Intake/Output last 3 Shifts:    Intake/Output Summary (Last 24 hours) at 5/15/2024 1306  Last data filed at 5/15/2024 0500  Gross per 24 hour   Intake --   Output 750 ml   Net -750 ml       Admission Weight  Weight: 122 kg (270 lb) (05/13/24 0833)    Daily Weight  05/15/24 : 122 kg (269 lb 6.4 oz)    Image Results  Vascular US Carotid Artery Duplex [place]ilateral             [place] [address]             Phone [phone]       Vascular Lab Report     [facility] US CAROTID ARTERY DUPLEX BILATERAL    Patient Name:      YANICK KRUSE    Reading Physician:  [id number] Lawrence Jacobo MD, RPVI  Study Date:        5/14/2024            Ordering Provider:  [id number] SHANNAN KAUFMAN  MRN/PID:           [id number]             Fellow:  Accession#:        [id number]         Technologist:       Renetta Brown RVT  Date of Birth/Age: 1940 / 83 years Technologist 2:  Gender:            M                    Encounter#:         [id number]  Admission Status:  Inpatient            Location Performed: [facility]       Diagnosis/ICD: Occlusion and stenosis of right carotid artery-I65.21  CPT Codes:     32240 Cerebrovascular Carotid Duplex scan complete       **CRITICAL RESULT**  Critical Result: There is greater than 70% stenosis of the right internal caroitd  artery.  Notification called to SHARLA Hicks on 5/14/2024 at 11:29:20 AM by Renetta Brown RVT.     CONCLUSIONS:  Right Carotid: Findings are consistent with greater than 70% stenosis of the right proximal internal carotid artery. Laminar flow seen by color Doppler. Right external carotid artery appears patent with no evidence of stenosis. The right vertebral artery is patent with antegrade flow. No evidence of hemodynamically significant stenosis in the right subclavian artery.  Left Carotid: Findings are consistent with 50 to 69% stenosis of the left proximal internal carotid artery. Left external carotid artery appears patent with no evidence of stenosis. The left vertebral artery is patent with antegrade flow. No evidence of hemodynamically significant stenosis in the left subclavian artery.  Additional Findings:  Technically difficult exam due to patient's positioning and deep inspiration.       Imaging & Doppler Findings:  Right Plaque Morph: The proximal right internal carotid artery demonstrates heterogenous and calcified plaque. The proximal right external carotid artery demonstrates heterogenous plaque. The mid right common carotid artery demonstrates heterogenous plaque. The distal right common carotid artery demonstrates heterogenous and calcified plaque.  Left Plaque Morph: The proximal left internal carotid artery demonstrates heterogenous and calcified plaque. The proximal left external carotid artery demonstrates heterogenous and calcified plaque. The distal left common carotid artery demonstrates heterogenous and calcified plaque.      Right                        Left    PSV      EDV                PSV      EDV  103 cm/s           CCA P    116 cm/s  103 cm/s           CCA D    110 cm/s  244 cm/s 58 cm/s   ICA P    189 cm/s 30 cm/s  126 cm/s 15 cm/s   ICA M    81 cm/s  19 cm/s  75 cm/s  14 cm/s   ICA D    69 cm/s  17 cm/s  155 cm/s            ECA     114 cm/s  62 cm/s  16 cm/s Vertebral   38 cm/s  9 cm/s  103 cm/s         Subclavian 100 cm/s                     Right Left  ICA/CCA Ratio  2.4  1.7          [id number] BARRON Jimenez MD  Electronically signed by [id number] BARRON Jimenez MD on 5/15/2024 at 11:25:56 AM       ** Final **      Physical Exam  Vitals reviewed.   Constitutional:       Appearance: Normal appearance.   HENT:      Head: Normocephalic and atraumatic.   Eyes:      Extraocular Movements: Extraocular movements intact.      Conjunctiva/sclera: Conjunctivae normal.   Cardiovascular:      Rate and Rhythm: Normal rate and regular rhythm.   Pulmonary:      Effort: Pulmonary effort is normal.      Breath sounds: Normal breath sounds. No wheezing, rhonchi or rales.   Abdominal:      General: Bowel sounds are normal.      Palpations: Abdomen is soft.      Tenderness: There is no abdominal tenderness.   Skin:     General: Skin is warm and dry.   Neurological:      Mental Status: He is alert and oriented to person, place, and time.      Sensory: Sensory deficit present.      Motor: Weakness present.         Relevant Results  Lab Results   Component Value Date    GLUCOSE 112 (H) 05/15/2024    CALCIUM 9.3 05/15/2024     05/15/2024    K 3.9 05/15/2024    CO2 23 (L) 05/15/2024     05/15/2024    BUN 19 05/15/2024    CREATININE 1.10 05/15/2024     Lab Results   Component Value Date    WBC 7.7 05/15/2024    HGB 13.0 (L) 05/15/2024    HCT 39.5 (L) 05/15/2024    MCV 98 05/15/2024     05/15/2024     Vascular US Carotid Artery Duplex Bilateral  Result Date: 5/14/2024  Preliminary Cardiology Report   Critical Result: There is greater than 70% stenosis of the right internal caroitd artery. Notification called to SHARLA Hicks on 5/14/2024 at 11:29:20 AM by Renetta Brown RVT.  PRELIMINARY CONCLUSIONS:  Right Carotid: Findings are consistent with greater than 70% stenosis of the right proximal internal carotid artery. Laminar flow seen by color Doppler. Right external carotid  artery appears patent with no evidence of stenosis. The right vertebral artery is patent with antegrade flow. No evidence of hemodynamically significant stenosis in the right subclavian artery. Left Carotid: Findings are consistent with 50 to 69% stenosis of the left proximal internal carotid artery. Left external carotid artery appears patent with no evidence of stenosis. The left vertebral artery is patent with antegrade flow. No evidence of hemodynamically significant stenosis in the left subclavian artery. Additional Findings: Technically difficult exam due to patient's positioning and deep inspiration.  Imaging & Doppler Findings: Right Plaque Morph: The proximal right internal carotid artery demonstrates heterogenous and calcified plaque. The proximal right external carotid artery demonstrates heterogenous plaque. The mid right common carotid artery demonstrates heterogenous plaque. The distal right common carotid artery demonstrates heterogenous and calcified plaque. Left Plaque Morph: The proximal left internal carotid artery demonstrates heterogenous and calcified plaque. The proximal left external carotid artery demonstrates heterogenous and calcified plaque. The distal left common carotid artery demonstrates heterogenous and calcified plaque.       MR angio neck wo IV contrast  Result Date: 5/13/2024  MRI BRAIN: 1. No acute infarct, acute hemorrhage, or intracranial mass effect. 2. Slight progression of mild supratentorial chronic ischemic white matter changes.   MRA HEAD AND NECK: 1. No evidence of major vessel occlusion or significant stenosis on MRA head. 2. There is a 60-65% stenosis of the left carotid bulb. There is a 75-80% stenosis of the right carotid bulb.   MACRO: None.   Signed by: Attila Raines 5/13/2024 11:08 PM Dictation workstation:   [id number]    XR arthrogram sacroiliac joints  Result Date: 5/13/2024  IMPRESSION: Fusion of the lumbar spine extending the bilateral iliac wing. Fusion  of the upper ligamentous aspect of the sacroiliac joints. : ANTONINA   Transcribe Date/Time: May 13 2024 11:52A Dictated by : STEVEN ROSS MD  This examination was interpreted and the report reviewed and electronically signed by: STEVEN ROSS MD on May 13 2024 12:06PM  EST    CT head wo IV contrast  Result Date: 5/13/2024  Unremarkable exam.   Signed by: Forest Carin 5/13/2024 9:06 AM Dictation workstation:   [id number]         Assessment/Plan      Principal Problem:    Dizziness    Right carotid artery stenosis  Seen on MRA  US carotid showed greater than 70% stenosis  Discussed with vascular surgery NP, will determine if CTA needed  Continue ASA  Vascular surgery following, appreciate recs  Duplex reviewed by Dr. Portillo, less than 70% , ok for follow up in three months with repeat duplex-clear for discharge    Dizziness rule out CVA  Reported dizziness, lightheadedness, headache  Has chronic neuropathy, decreased strength and sensation left hand  ASA/statin  lipid panel reviewed  MRI/MRA results as above  Echocardiogram pending results  consult neurology, appreciate recommendations-recommend outpatient follow up with ENT-cleared for discharge     Hypertension  Resume antihypertensives  PRN apresoline     Hyperlipidemia  Statin     History of osteomyelitis of lumbar spine  Follows with Dr. Arriaga outpatient  Continue chronic Keflex     Chronic back pain  Resume home medications     Plan  Transfer to regular floor, discontinue tele  Stroke workup negative  Cleared by all consultants for discharge  Need rapid referral to ENT outpatient, will see if care coordinator can arrange  DVT prophylaxis: Lovenox  PT/OT  CBC and BMP in AM     CODE STATUS: Discussed with the patient and his wife.  Patient is a full code.              Yolie Ortega, APRN-CNP

## 2024-05-15 NOTE — PROGRESS NOTES
"History Of Present Illness  Yanick Kruse is a 83 y.o. male, who is consulted for \"chronic sinus drainage and vertigo\".  The patient reports receiving treatment for sinus issues since March 2024, coinciding with the onset of mild dizziness and lightheadedness. On Saturday, May 11, 2024, he experienced a sudden escalation of these symptoms. While seated on the toilet, he described a momentary loss of consciousness akin to a sudden switch being flipped off. Though he regained consciousness shortly after, the following morning he found himself unable to rise from bed to urinate, necessitating him to remain lying down. Subsequently admitted to the hospital, he underwent comprehensive testing, including an MRA revealing 75 to 80% stenosis of the right carotid bulb and 60 to 65% stenosis of the left carotid bulb.    Recent CT and MRI scans conducted within the last 48 hours reveal no evidence of sinus disease. However, they do indicate a mildly deviated nasal septum and hypertrophy of the inferior nasal turbinates.    Although his medical history does not suggest dizziness-vertigo stemming from cochlear pathology, I recommend pursuing audiovestibular tests (1- audiogram, 2- electrocochleography, 3- electronystagmography) for further clarification. These tests are unavailable at [facility].     If his discharge is anticipated following stabilization of his dizziness (which has not been present since yesterday), he can schedule these tests as an outpatient by calling , and follow up with me thereafter. Thank you for letting me participate in his care.    Past Medical History  He has a past medical history of Carotid artery disease (CMS-HCC), Chronic back pain, Hyperlipidemia, Hypertension, Osteomyelitis of lumbar spine (Multi), Spinal stenosis, and Unspecified hearing loss, bilateral.    Surgical History  He has a past surgical history that includes Lumbar laminectomy (06/24/2014); MR angio head wo IV " contrast (10/27/2022); and Spinal fusion.     Social History  He reports that he has quit smoking. He does not have any smokeless tobacco history on file. He reports that he does not drink alcohol and does not use drugs.    Family History  Family History   Problem Relation Name Age of Onset    Heart disease Mother      Stomach cancer Father          Allergies  Penicillins, Codeine, Naproxen, Vardenafil, Amoxicillin, and Sulfa (sulfonamide antibiotics)    Review of Systems        Physical Exam    General appearance: Healthy-appearing, well-nourished, well groomed, in no acute distress.     Head and Face: Atraumatic with no masses, lesions, or scarring.      Salivary glands: No tenderness of the parotid glands or parotid masses.     No tenderness of the submandibular glands or submandibular masses.      Facial strength: Normal strength and symmetry, no synkinesis or facial tic.     Eyes: Conjunctivas look non-hyperemic bilaterally    Ears: Wax in right ear canal. Left ear canal looks normal. Left tympanic membrane looks intact, no hyperemia, fluid or retraction. Hearing decreased     Nose: Mucosa looks normal. No purulent discharge.     Oral Cavity/Mouth: Lips and tongue look normal.     Neck: Symmetrical, trachea midline.     Pulmonary: Normal respiratory effort.     Lymphatic: No palpable pathologic lymph nodes at neck.     Neurological/Psychiatric Orientation to person, place, and time: Normal.     Mood and affect: Normal.      Extremities: No clubbing.     Skin: No significant skin lesions were noted at face or neck        Procedure       Last Recorded Vitals  There were no vitals taken for this visit.    Relevant Results  Prior to Admission medications    Medication Sig Start Date End Date Taking? Authorizing Provider   amlodipine-olmesartan (Alycia) 10-40 mg tablet Take 1 tablet by mouth once daily.    Historical Provider, MD   aspirin 325 mg tablet Take 1 tablet (325 mg) by mouth once daily.    Historical  Provider, MD   azelastine (Optivar) 0.05 % ophthalmic solution Administer 1 drop into both eyes 2 times a day.    Historical Provider, MD   cephalexin (Keflex) 500 mg capsule Take 1 capsule (500 mg) by mouth 2 times a day.    Historical Provider, MD   docusate sodium (Colace) 100 mg capsule Take 1 capsule (100 mg) by mouth 2 times a day.    Historical Provider, MD   fluticasone (Flonase) 50 mcg/actuation nasal spray Administer 2 sprays into each nostril once daily. Shake gently. Before first use, prime pump. After use, clean tip and replace cap.    Historical Provider, MD   gabapentin (Neurontin) 300 mg capsule Take 2 capsules (600 mg) by mouth once daily at bedtime.    Historical Provider, MD   gabapentin (Neurontin) 300 mg capsule Take 1 capsule (300 mg) by mouth once daily.    Historical Provider, MD   ketoconazole (NIZOral) 2 % cream Apply topically 2 times a day.    Historical Provider, MD   multivitamin tablet Take 1 tablet by mouth once daily.    Historical Provider, MD   oxyCODONE-acetaminophen (Percocet) 5-325 mg tablet Take 1 tablet by mouth every 12 hours if needed for severe pain (7 - 10). 5/6/24 6/5/24  Historical Provider, MD   rosuvastatin (Crestor) 20 mg tablet Take 1 tablet (20 mg) by mouth once daily.    Historical Provider, MD   SAW PALMETTO ORAL Take by mouth once daily.    Historical Provider, MD   aspirin 81 mg EC tablet Take 1 tablet (81 mg) by mouth once daily.  5/13/24  Historical Provider, MD       MRI BRAIN: 1. No acute infarct, acute hemorrhage, or intracranial mass effect. 2. Slight progression of mild supratentorial chronic ischemic white matter changes.   MRA HEAD AND NECK: 1. No evidence of major vessel occlusion or significant stenosis on MRA head. 2. There is a 60-65% stenosis of the left carotid bulb. There is a 75-80% stenosis of the right carotid bulb.   MACRO: None.   Signed by: Attila Raines 5/13/2024 11:08 PM Dictation workstation:   [id number]  onically signed by: STEVEN  "MD CODY on May 13 2024 12:06PM  EST    CT head wo IV contrast    Result Date: 5/13/2024  Interpreted By:  Forest Del Rosario, STUDY: CT HEAD WO IV CONTRAST; 5/13/2024 8:51 am   INDICATION: Signs/Symptoms:dizziness.   COMPARISON: 10/26/2022.   ACCESSION NUMBER(S): [id number]   ORDERING CLINICIAN: KHOI DE LA FUENTE   TECHNIQUE: CT axial images through the Brain were obtained without contrast.   FINDINGS: There is no mass effect, hemorrhage, or infarct. The ventricles are normal.  The visualized paranasal sinuses are clear.       Unremarkable exam.   Signed by: Forest Del Rosario 5/13/2024 9:06 AM Dictation workstation:   [id number]          Assessment and Plan:  Yanick Kruse is a 83 y.o. male, who is consulted for \"chronic sinus drainage and vertigo\".  The patient reports receiving treatment for sinus issues since March 2024, coinciding with the onset of mild dizziness and lightheadedness. On Saturday, May 11, 2024, he experienced a sudden escalation of these symptoms. While seated on the toilet, he described a momentary loss of consciousness akin to a sudden switch being flipped off. Though he regained consciousness shortly after, the following morning he found himself unable to rise from bed to urinate, necessitating him to remain lying down. Subsequently admitted to the hospital, he underwent comprehensive testing, including an MRA revealing 75 to 80% stenosis of the right carotid bulb and 60 to 65% stenosis of the left carotid bulb.    Recent CT and MRI scans conducted within the last 48 hours reveal no evidence of sinus disease. However, they do indicate a mildly deviated nasal septum and hypertrophy of the inferior nasal turbinates.    Although his medical history does not suggest dizziness-vertigo stemming from cochlear pathology, I recommend pursuing audiovestibular tests (1- audiogram, 2- electrocochleography, 3- electronystagmography) for further clarification. These tests are unavailable at [facility]. "     If his discharge is anticipated following stabilization of his dizziness (which has not been present since yesterday), he can schedule these tests as an outpatient by calling , and follow up with me thereafter. Thank you for letting me participate in his care.    Sarah Page MD  Otolaryngology - Head & Neck Surgery

## 2024-05-16 LAB
AORTIC VALVE MEAN GRADIENT: 8.5 MMHG
AORTIC VALVE PEAK VELOCITY: 1.97 M/S
AV PEAK GRADIENT: 15.6 MMHG
AVA (PEAK VEL): 2.08 CM2
AVA (VTI): 2.16 CM2
EJECTION FRACTION APICAL 4 CHAMBER: 70.9
LEFT VENTRICLE INTERNAL DIMENSION DIASTOLE: 3.66 CM (ref 3.5–6)
LEFT VENTRICULAR OUTFLOW TRACT DIAMETER: 2.01 CM
LV EJECTION FRACTION BIPLANE: 71 %
MITRAL VALVE E/A RATIO: 0.59
MITRAL VALVE E/E' RATIO: 5.58

## 2024-05-16 PROCEDURE — 97110 THERAPEUTIC EXERCISES: CPT | Mod: GP,CQ

## 2024-05-16 PROCEDURE — 97116 GAIT TRAINING THERAPY: CPT | Mod: GP,CQ

## 2024-05-16 PROCEDURE — G0378 HOSPITAL OBSERVATION PER HR: HCPCS

## 2024-05-16 PROCEDURE — 97530 THERAPEUTIC ACTIVITIES: CPT | Mod: GO

## 2024-05-16 PROCEDURE — 1210000001 HC SEMI-PRIVATE ROOM DAILY

## 2024-05-16 PROCEDURE — 2500000001 HC RX 250 WO HCPCS SELF ADMINISTERED DRUGS (ALT 637 FOR MEDICARE OP): Performed by: NURSE PRACTITIONER

## 2024-05-16 PROCEDURE — 2500000004 HC RX 250 GENERAL PHARMACY W/ HCPCS (ALT 636 FOR OP/ED): Performed by: NURSE PRACTITIONER

## 2024-05-16 RX ADMIN — ENOXAPARIN SODIUM 40 MG: 40 INJECTION SUBCUTANEOUS at 09:08

## 2024-05-16 RX ADMIN — DOCUSATE SODIUM 100 MG: 100 CAPSULE, LIQUID FILLED ORAL at 22:20

## 2024-05-16 RX ADMIN — ASPIRIN 325 MG: 325 TABLET, COATED ORAL at 09:08

## 2024-05-16 RX ADMIN — DOCUSATE SODIUM 100 MG: 100 CAPSULE, LIQUID FILLED ORAL at 09:08

## 2024-05-16 RX ADMIN — CEPHALEXIN 500 MG: 500 CAPSULE ORAL at 22:20

## 2024-05-16 RX ADMIN — CEPHALEXIN 500 MG: 500 CAPSULE ORAL at 09:08

## 2024-05-16 RX ADMIN — MULTIVITAMIN TABLET 1 TABLET: TABLET at 09:08

## 2024-05-16 RX ADMIN — GABAPENTIN 600 MG: 600 TABLET, FILM COATED ORAL at 22:20

## 2024-05-16 RX ADMIN — KETOTIFEN FUMARATE 1 DROP: 0.25 SOLUTION/ DROPS OPHTHALMIC at 09:08

## 2024-05-16 RX ADMIN — KETOTIFEN FUMARATE 1 DROP: 0.25 SOLUTION/ DROPS OPHTHALMIC at 22:21

## 2024-05-16 RX ADMIN — ATORVASTATIN CALCIUM 40 MG: 40 TABLET, FILM COATED ORAL at 22:20

## 2024-05-16 ASSESSMENT — COGNITIVE AND FUNCTIONAL STATUS - GENERAL
DRESSING REGULAR UPPER BODY CLOTHING: A LITTLE
TOILETING: A LITTLE
CLIMB 3 TO 5 STEPS WITH RAILING: A LITTLE
WALKING IN HOSPITAL ROOM: A LITTLE
STANDING UP FROM CHAIR USING ARMS: A LITTLE
STANDING UP FROM CHAIR USING ARMS: A LITTLE
MOVING TO AND FROM BED TO CHAIR: A LITTLE
DAILY ACTIVITIY SCORE: 18
MOVING TO AND FROM BED TO CHAIR: A LITTLE
CLIMB 3 TO 5 STEPS WITH RAILING: A LITTLE
DRESSING REGULAR LOWER BODY CLOTHING: A LITTLE
PERSONAL GROOMING: A LITTLE
HELP NEEDED FOR BATHING: A LOT
PERSONAL GROOMING: A LITTLE
MOVING FROM LYING ON BACK TO SITTING ON SIDE OF FLAT BED WITH BEDRAILS: A LITTLE
TURNING FROM BACK TO SIDE WHILE IN FLAT BAD: A LITTLE
DRESSING REGULAR LOWER BODY CLOTHING: A LITTLE
TOILETING: A LOT
DRESSING REGULAR UPPER BODY CLOTHING: A LITTLE
MOBILITY SCORE: 18
DRESSING REGULAR LOWER BODY CLOTHING: A LITTLE
TURNING FROM BACK TO SIDE WHILE IN FLAT BAD: A LITTLE
WALKING IN HOSPITAL ROOM: A LITTLE
MOBILITY SCORE: 18
MOBILITY SCORE: 18
CLIMB 3 TO 5 STEPS WITH RAILING: A LITTLE
PERSONAL GROOMING: A LITTLE
DRESSING REGULAR UPPER BODY CLOTHING: A LITTLE
MOVING FROM LYING ON BACK TO SITTING ON SIDE OF FLAT BED WITH BEDRAILS: A LITTLE
HELP NEEDED FOR BATHING: A LITTLE
TOILETING: A LITTLE
STANDING UP FROM CHAIR USING ARMS: A LITTLE
WALKING IN HOSPITAL ROOM: A LITTLE
TURNING FROM BACK TO SIDE WHILE IN FLAT BAD: A LITTLE
MOVING TO AND FROM BED TO CHAIR: A LITTLE
MOVING FROM LYING ON BACK TO SITTING ON SIDE OF FLAT BED WITH BEDRAILS: A LITTLE
HELP NEEDED FOR BATHING: A LOT

## 2024-05-16 ASSESSMENT — PAIN - FUNCTIONAL ASSESSMENT
PAIN_FUNCTIONAL_ASSESSMENT: 0-10

## 2024-05-16 ASSESSMENT — PAIN SCALES - GENERAL
PAINLEVEL_OUTOF10: 0 - NO PAIN

## 2024-05-16 NOTE — PROGRESS NOTES
Anticipate discharge soon. Precert started on 5/15/2024 for admission to [facility], remains pending at this time. 7000 to be completed. Will follow.      05/16/24 1115   Discharge Planning   Home or Post Acute Services Post acute facilities (Rehab/SNF/etc)   Type of Post Acute Facility Services Rehab   Patient expects to be discharged to: [facility]   Does the patient need discharge transport arranged? Yes   RoundTrip coordination needed? Yes

## 2024-05-16 NOTE — PROGRESS NOTES
Physical Therapy    Physical Therapy Treatment    Patient Name: Yanick Kruse  MRN: [id number]  Today's Date: 5/16/2024  Time Calculation  Start Time: 0820  Stop Time: 0846  Time Calculation (min): 26 min    Assessment/Plan   PT Assessment  End of Session Communication: Bedside nurse  End of Session Patient Position: Up in chair, Alarm off, not on at start of session  PT Plan  Inpatient/Swing Bed or Outpatient: Inpatient  PT Plan  Treatment/Interventions: Bed mobility, Transfer training, Gait training, Stair training, Balance training, Strengthening, Endurance training, Range of motion, Therapeutic exercise, Therapeutic activity  PT Plan: Skilled PT  PT Frequency: 4 times per week  PT Discharge Recommendations: Moderate intensity level of continued care  Equipment Recommended upon Discharge: Wheeled walker  PT Recommended Transfer Status: Assist x2, Other (comment) (for safety at this time)  PT - OK to Discharge: Yes      General Visit Information:   PT  Visit  PT Received On: 05/16/24  General  Prior to Session Communication: Bedside nurse  Patient Position Received: Up in chair, Alarm off, not on at start of session  General Comment: Cleared by nursing to be seen for therapy, pt agreeable with tx, seated in chair upon arrival.    Subjective     Objective   Pain:  Pain Assessment  Pain Assessment: 0-10  Pain Score: 0 - No pain     Postural Control:  Static Sitting Balance  Static Sitting-Balance Support: Feet supported  Static Sitting-Level of Assistance: Modified independent  Static Standing Balance  Static Standing-Balance Support: Bilateral upper extremity supported  Static Standing-Level of Assistance: Contact guard     Treatments:  Therapeutic Exercise  Therapeutic Exercise Performed: Yes  Therapeutic Exercise Activity 1: Standing heel raises x10  Therapeutic Exercise Activity 2: Standing hip flexion x10  Therapeutic Exercise Activity 3: Standing marches x10  Therapeutic Exercise Activity 4: Mini squats  x10    Bed Mobility  Bed Mobility: No    Ambulation/Gait Training  Ambulation/Gait Training Performed: Yes  Ambulation/Gait Training 1  Surface 1: Level tile  Device 1: Rolling walker  Assistance 1: Contact guard  Quality of Gait 1: Diminished heel strike, Decreased step length, Shuffling gait, Wide base of support  Comments/Distance (ft) 1: 50' with wheeled walker, presents with slow kelle, decreased bilateral step length, CGA for balance.    Transfers  Transfer: Yes  Transfer 1  Transfer From 1: Sit to  Transfer to 1: Stand  Technique 1: Sit to stand, Stand to sit  Transfer Level of Assistance 1: Contact guard  Trials/Comments 1: Cues for proper hand placement, cues for safety during eccentric control in sitting.    Outcome Measures:  [facility] Basic Mobility  Turning from your back to your side while in a flat bed without using bedrails: A little  Moving from lying on your back to sitting on the side of a flat bed without using bedrails: A little  Moving to and from bed to chair (including a wheelchair): A little  Standing up from a chair using your arms (e.g. wheelchair or bedside chair): A little  To walk in hospital room: A little  Climbing 3-5 steps with railing: A little  Basic Mobility - Total Score: 18       Encounter Problems       Encounter Problems (Active)       Balance       STG - Maintains dynamic standing balance with upper extremity support (Progressing)       Start:  05/14/24    Expected End:  06/11/24       INTERVENTIONS:  1. Practice standing with minimal support.  2. Educate patient about standing tolerance.  3. Educate patient about independence with gait, transfers, and ADL's.  4. Educate patient about use of assistive device.  5. Educate patient about self-directed care.            Mobility       STG - Patient will ambulate 300 ft, + turns, LRAD, close supervision of 1 (Progressing)       Start:  05/14/24    Expected End:  06/11/24            pt ascends/descends 12 steps with 1 handrail,  reciprocally, close supervision of 1 (Not Progressing)       Start:  05/14/24    Expected End:  06/11/24               PT Transfers       STG - Patient to transfer to and from sit to supine mod I (Progressing)       Start:  05/14/24    Expected End:  06/11/24            STG - Patient will transfer sit to and from stand distant supervision of 1 (Progressing)       Start:  05/14/24    Expected End:  06/11/24               Safety       LTG - Patient will demonstrate safety requirements appropriate to situation/environment (Progressing)       Start:  05/14/24    Expected End:  06/11/24

## 2024-05-16 NOTE — PROGRESS NOTES
Yanick Kruse is a 83 y.o. male on day 3 of admission presenting with Dizziness.      Subjective   Patient seen and examined. Awake/alert/oriented. Denies dizziness or headache at this time. Denies chest pain, shortness of breath, fevers, chills, nausea, or vomiting. No abdominal discomfort. Overnight issues with sleeping 2/2 roommate's confusion.         Objective     Last Recorded Vitals  /61 (BP Location: Right arm, Patient Position: Sitting)   Pulse 69   Temp 36.6 °C (97.9 °F) (Oral)   Resp 17   Wt 122 kg (269 lb 6.4 oz)   SpO2 97%   Intake/Output last 3 Shifts:    Intake/Output Summary (Last 24 hours) at 5/16/2024 1033  Last data filed at 5/15/2024 1759  Gross per 24 hour   Intake 360 ml   Output --   Net 360 ml       Admission Weight  Weight: 122 kg (270 lb) (05/13/24 0833)    Daily Weight  05/15/24 : 122 kg (269 lb 6.4 oz)    Image Results  Vascular US Carotid Artery Duplex [place] [address]             Phone [phone]       Vascular Lab Report     VASC US CAROTID ARTERY DUPLEX BILATERAL    Patient Name:      YANIKC KRUSE    Reading Physician:  [id number] Lawrence Jacobo MD, RPVI  Study Date:        5/14/2024            Ordering Provider:  [id number] SHANNAN KAUFMAN  MRN/PID:           [id number]             Fellow:  Accession#:        [id number]         Technologist:       Renetta Brown RVT  Date of Birth/Age: 1940 / 83 years Technologist 2:  Gender:            M                    Encounter#:         [id number]  Admission Status:  Inpatient            Location Performed: [facility]       Diagnosis/ICD: Occlusion and stenosis of right carotid artery-I65.21  CPT Codes:     62304 Cerebrovascular Carotid Duplex scan complete       **CRITICAL RESULT**  Critical Result: There is greater than 70%  stenosis of the right internal caroitd artery.  Notification called to SHARLA Hicks on 5/14/2024 at 11:29:20 AM by Renetta Brown RVT.     CONCLUSIONS:  Right Carotid: Findings are consistent with greater than 70% stenosis of the right proximal internal carotid artery. Laminar flow seen by color Doppler. Right external carotid artery appears patent with no evidence of stenosis. The right vertebral artery is patent with antegrade flow. No evidence of hemodynamically significant stenosis in the right subclavian artery.  Left Carotid: Findings are consistent with 50 to 69% stenosis of the left proximal internal carotid artery. Left external carotid artery appears patent with no evidence of stenosis. The left vertebral artery is patent with antegrade flow. No evidence of hemodynamically significant stenosis in the left subclavian artery.  Additional Findings:  Technically difficult exam due to patient's positioning and deep inspiration.       Imaging & Doppler Findings:  Right Plaque Morph: The proximal right internal carotid artery demonstrates heterogenous and calcified plaque. The proximal right external carotid artery demonstrates heterogenous plaque. The mid right common carotid artery demonstrates heterogenous plaque. The distal right common carotid artery demonstrates heterogenous and calcified plaque.  Left Plaque Morph: The proximal left internal carotid artery demonstrates heterogenous and calcified plaque. The proximal left external carotid artery demonstrates heterogenous and calcified plaque. The distal left common carotid artery demonstrates heterogenous and calcified plaque.      Right                        Left    PSV      EDV                PSV      EDV  103 cm/s           CCA P    116 cm/s  103 cm/s           CCA D    110 cm/s  244 cm/s 58 cm/s   ICA P    189 cm/s 30 cm/s  126 cm/s 15 cm/s   ICA M    81 cm/s  19 cm/s  75 cm/s  14 cm/s   ICA D    69 cm/s  17 cm/s  155 cm/s            ECA      114 cm/s  62 cm/s  16 cm/s Vertebral  38 cm/s  9 cm/s  103 cm/s         Subclavian 100 cm/s                     Right Left  ICA/CCA Ratio  2.4  1.7          [id number] BARRON Jimenez MD  Electronically signed by [id number] BARRON Jimenez MD on 5/15/2024 at 11:25:56 AM       ** Final **      Physical Exam  Vitals reviewed.   Constitutional:       Appearance: Normal appearance.   HENT:      Head: Normocephalic and atraumatic.   Eyes:      Extraocular Movements: Extraocular movements intact.      Conjunctiva/sclera: Conjunctivae normal.   Cardiovascular:      Rate and Rhythm: Normal rate and regular rhythm.   Pulmonary:      Effort: Pulmonary effort is normal.      Breath sounds: Normal breath sounds. No wheezing, rhonchi or rales.   Abdominal:      General: Bowel sounds are normal.      Palpations: Abdomen is soft.      Tenderness: There is no abdominal tenderness.   Skin:     General: Skin is warm and dry.   Neurological:      Mental Status: He is alert and oriented to person, place, and time.      Sensory: Sensory deficit present.      Motor: Weakness present.         Relevant Results  Lab Results   Component Value Date    GLUCOSE 112 (H) 05/15/2024    CALCIUM 9.3 05/15/2024     05/15/2024    K 3.9 05/15/2024    CO2 23 (L) 05/15/2024     05/15/2024    BUN 19 05/15/2024    CREATININE 1.10 05/15/2024     Lab Results   Component Value Date    WBC 7.7 05/15/2024    HGB 13.0 (L) 05/15/2024    HCT 39.5 (L) 05/15/2024    MCV 98 05/15/2024     05/15/2024     Vascular US Carotid Artery Duplex Bilateral  Result Date: 5/14/2024  Preliminary Cardiology Report   Critical Result: There is greater than 70% stenosis of the right internal caroitd artery. Notification called to SHARLA Hicks on 5/14/2024 at 11:29:20 AM by Renetta Brown RVT.  PRELIMINARY CONCLUSIONS:  Right Carotid: Findings are consistent with greater than 70% stenosis of the right proximal internal carotid artery. Laminar flow seen by  color Doppler. Right external carotid artery appears patent with no evidence of stenosis. The right vertebral artery is patent with antegrade flow. No evidence of hemodynamically significant stenosis in the right subclavian artery. Left Carotid: Findings are consistent with 50 to 69% stenosis of the left proximal internal carotid artery. Left external carotid artery appears patent with no evidence of stenosis. The left vertebral artery is patent with antegrade flow. No evidence of hemodynamically significant stenosis in the left subclavian artery. Additional Findings: Technically difficult exam due to patient's positioning and deep inspiration.  Imaging & Doppler Findings: Right Plaque Morph: The proximal right internal carotid artery demonstrates heterogenous and calcified plaque. The proximal right external carotid artery demonstrates heterogenous plaque. The mid right common carotid artery demonstrates heterogenous plaque. The distal right common carotid artery demonstrates heterogenous and calcified plaque. Left Plaque Morph: The proximal left internal carotid artery demonstrates heterogenous and calcified plaque. The proximal left external carotid artery demonstrates heterogenous and calcified plaque. The distal left common carotid artery demonstrates heterogenous and calcified plaque.       MR angio neck wo IV contrast  Result Date: 5/13/2024  MRI BRAIN: 1. No acute infarct, acute hemorrhage, or intracranial mass effect. 2. Slight progression of mild supratentorial chronic ischemic white matter changes.   MRA HEAD AND NECK: 1. No evidence of major vessel occlusion or significant stenosis on MRA head. 2. There is a 60-65% stenosis of the left carotid bulb. There is a 75-80% stenosis of the right carotid bulb.   MACRO: None.   Signed by: Attila Raines 5/13/2024 11:08 PM Dictation workstation:   [id number]    XR arthrogram sacroiliac joints  Result Date: 5/13/2024  IMPRESSION: Fusion of the lumbar spine  extending the bilateral iliac wing. Fusion of the upper ligamentous aspect of the sacroiliac joints. : [email or web address]   Transcribe Date/Time: May 13 2024 11:52A Dictated by : STEVEN ROSS MD  This examination was interpreted and the report reviewed and electronically signed by: STEVEN ROSS MD on May 13 2024 12:06PM  EST    CT head wo IV contrast  Result Date: 5/13/2024  Unremarkable exam.   Signed by: Forest Del Rosario 5/13/2024 9:06 AM Dictation workstation:   [id number]         Assessment/Plan      Principal Problem:    Dizziness    Right carotid artery stenosis  Seen on MRA  US carotid showed greater than 70% stenosis  Continue ASA  Vascular surgery following, appreciate recs  Duplex reviewed by Dr. Portillo, less than 70% , ok for follow up in three months with repeat duplex-clear for discharge    Dizziness rule out CVA  Reported dizziness, lightheadedness, headache- now resolved  Has chronic neuropathy, decreased strength and sensation left hand  ASA/statin  lipid panel reviewed  MRI/MRA results as above  Echocardiogram pending results  consult neurology, appreciate recommendations-recommend outpatient follow up with neurology  ENT-cleared for discharge, need OP follow up with ENT     Hypertension  Resume antihypertensives  PRN apresoline     Hyperlipidemia  Statin     History of osteomyelitis of lumbar spine  Follows with Dr. Arriaga outpatient  Continue chronic Keflex     Chronic back pain  Resume home medications     Plan  Stroke workup negative  Cleared by all consultants for discharge  Need referral to ENT outpatient, will see if care coordinator can arrange  DVT prophylaxis: Lovenox  PT/OT- SNF- pending placement     CODE STATUS:  Patient is a full code.    Temi Telles, APRN-CNP

## 2024-05-16 NOTE — CARE PLAN
The patient's goals for the shift include feel better    The clinical goals for the shift include pt will remain free from injury    Problem: Pain  Goal: My pain/discomfort is manageable  Outcome: Progressing     Problem: Safety  Goal: Patient will be injury free during hospitalization  Outcome: Progressing  Goal: I will remain free of falls  Outcome: Progressing     Problem: Daily Care  Goal: Daily care needs are met  Outcome: Progressing     Problem: Psychosocial Needs  Goal: Demonstrates ability to cope with hospitalization/illness  Outcome: Progressing  Goal: Collaborate with me, my family, and caregiver to identify my specific goals  Outcome: Progressing     Problem: Discharge Barriers  Goal: My discharge needs are met  Outcome: Progressing     Problem: Fall/Injury  Goal: Not fall by end of shift  Outcome: Progressing  Goal: Be free from injury by end of the shift  Outcome: Progressing  Goal: Verbalize understanding of personal risk factors for fall in the hospital  Outcome: Progressing  Goal: Verbalize understanding of risk factor reduction measures to prevent injury from fall in the home  Outcome: Progressing  Goal: Use assistive devices by end of the shift  Outcome: Progressing  Goal: Pace activities to prevent fatigue by end of the shift  Outcome: Progressing     Problem: Pain  Goal: Takes deep breaths with improved pain control throughout the shift  Outcome: Progressing  Goal: Turns in bed with improved pain control throughout the shift  Outcome: Progressing  Goal: Walks with improved pain control throughout the shift  Outcome: Progressing  Goal: Performs ADL's with improved pain control throughout shift  Outcome: Progressing  Goal: Participates in PT with improved pain control throughout the shift  Outcome: Progressing  Goal: Free from opioid side effects throughout the shift  Outcome: Progressing  Goal: Free from acute confusion related to pain meds throughout the shift  Outcome: Progressing     Problem:  Bathing  Goal: STG - Patient will bathe UB/LB with close supervision and AE  Outcome: Progressing     Problem: Dressings Lower Extremities  Goal: LTG - Patient will dress lower body with close supervision and AE  Outcome: Progressing     Problem: Dressing Upper Extremities  Goal: LTG - Patient will complete upper body dressing independent  Outcome: Progressing     Problem: Grooming  Goal: STG - Patient completes grooming independent  Outcome: Progressing     Problem: Toileting  Goal: LTG - Patient will complete daily toileting tasks independent with 2ww  Outcome: Progressing

## 2024-05-16 NOTE — PROGRESS NOTES
Occupational Therapy    OT Treatment    Patient Name: Yanick Kruse  MRN: [id number]  Today's Date: 5/16/2024  Time Calculation  Start Time: 1555  Stop Time: 1610  Time Calculation (min): 15 min         Assessment:  OT Assessment: steady progress, CGA/SBA when standing for ADLs  Prognosis: Good  Barriers to Discharge: None  Evaluation/Treatment Tolerance: Patient tolerated treatment well  Medical Staff Made Aware: Yes  End of Session Communication: Bedside nurse  End of Session Patient Position: Up in chair, Alarm off, not on at start of session  OT Assessment Results: Decreased ADL status, Decreased endurance, Decreased functional mobility  Prognosis: Good  Barriers to Discharge: None  Evaluation/Treatment Tolerance: Patient tolerated treatment well  Medical Staff Made Aware: Yes  Strengths: Ability to acquire knowledge, Attitude of self  Plan:  Treatment Interventions: ADL retraining, Functional transfer training, UE strengthening/ROM, Endurance training, Equipment evaluation/education, Compensatory technique education  OT Frequency: 4 times per week  OT Discharge Recommendations: Moderate intensity level of continued care  Equipment Recommended upon Discharge: Wheeled walker  OT Recommended Transfer Status: Assist of 1, Stand by assist  OT - OK to Discharge: Yes  Treatment Interventions: ADL retraining, Functional transfer training, UE strengthening/ROM, Endurance training, Equipment evaluation/education, Compensatory technique education    Subjective   Previous Visit Info:  OT Last Visit  OT Received On: 05/16/24  General:  General  Reason for Referral: dizziness; s/p falls; impaired mobility  Referred By: '  Past Medical History Relevant to Rehab: Yolie Ortega CNP  Family/Caregiver Present: No  Prior to Session Communication: Bedside nurse  Patient Position Received: Bed, 3 rail up, Alarm off, not on at start of session  Preferred Learning Style: auditory, kinesthetic  General Comment: pt cooperative and  pleasant throughout  Precautions:  Medical Precautions: Fall precautions     Pain:  Pain Assessment  Pain Assessment: 0-10  Pain Score: 0 - No pain    Objective    Cognition:  Cognition  Overall Cognitive Status: Within Functional Limits  Orientation Level: Oriented X4     Activities of Daily Living: Grooming  Grooming Level of Assistance: Close supervision  Grooming Where Assessed: Standing sinkside  Grooming Comments: pt performed hand washing while standing at sink with FWW at [facility] for safety due to fall risk/fatigue    Toileting  Toileting Level of Assistance: Close supervision  Where Assessed: Toilet  Toileting Comments: pt was able to stand infront of toilet with grab bar to perform toileting in standing position.  pt performed at [facility] for safety  Functional Standing Tolerance:  Time: 4-5 minutes  Bed Mobility/Transfers: Bed Mobility 1  Bed Mobility 1: Supine to sitting  Level of Assistance 1: Distant supervision  Bed Mobility Comments 1: no physical assistance, performed with HOB slightly elevated    Transfer 1  Technique 1: Sit to stand, Stand to sit  Transfer Device 1: Walker  Transfer Level of Assistance 1: Close supervision  Trials/Comments 1: from edge of bed, cues for hand placement and positioning  Transfers 2  Transfer to 2: Chair with arms  Technique 2: Stand pivot  Transfer Device 2: Walker  Transfer Level of Assistance 2: Contact guard  Trials/Comments 2: pt transferred to chair at end of session      Functional Mobility:  Functional Mobility  Functional Mobility Performed: Yes  Functional Mobility 1  Surface 1: Level tile  Device 1: Rolling walker  Assistance 1: Contact guard  Comments 1: max household distances including to/from bathroom and out into unit hallway with FWW.  pt performed at [facility] for safety and due to mild fatigue at end of trial.  no significant LOB noted throughout  Sitting Balance:  Static Sitting Balance  Static Sitting-Balance Support: Feet supported  Static Sitting-Level of  Assistance: Distant supervision  Standing Balance:  Static Standing Balance  Static Standing-Balance Support: No upper extremity supported  Static Standing-Level of Assistance: Close supervision, Contact guard     Therapy/Activity: Therapeutic Activity  Therapeutic Activity Performed: Yes  Therapeutic Activity 1: sit<>Stands from standard chair while holding pillow across chest to remove UE assistance.  pt performed x5 reps x2 sets total at [facility] for safety.  focus of task to increased endurance/balance for ADLs/functional mobility    Outcome Measures:[facility] Daily Activity  Putting on and taking off regular lower body clothing: A little  Bathing (including washing, rinsing, drying): A lot  Putting on and taking off regular upper body clothing: A little  Toileting, which includes using toilet, bedpan or urinal: A little  Taking care of personal grooming such as brushing teeth: A little  Eating Meals: None  Daily Activity - Total Score: 18        Education Documentation  ADL Training, taught by Izaiah Osuna OT at 5/16/2024  4:24 PM.  Learner: Patient  Readiness: Acceptance  Method: Explanation  Response: Verbalizes Understanding    Education Comments  No comments found.        OP EDUCATION:       Goals:  Encounter Problems       Encounter Problems (Active)       Bathing       STG - Patient will bathe UB/LB with close supervision and AE (Progressing)       Start:  05/14/24    Expected End:  06/14/24               Dressing Upper Extremities       LTG - Patient will complete upper body dressing independent (Progressing)       Start:  05/14/24    Expected End:  06/14/24               Dressings Lower Extremities       LTG - Patient will dress lower body with close supervision and AE (Progressing)       Start:  05/14/24    Expected End:  06/14/24               Functional Mobility       Perform functional mobility to and from the bathroom independent with 2ww (Progressing)       Start:  05/14/24    Expected End:  06/14/24                Grooming       STG - Patient completes grooming independent (Progressing)       Start:  05/14/24    Expected End:  06/14/24               OT Transfers       perform  all functional transfers independent with 2ww (Progressing)       Start:  05/14/24    Expected End:  06/14/24               Therapeutic Activity       Perform BUE exercise with close supervision (Progressing)       Start:  05/14/24    Expected End:  06/14/24               Toileting       LTG - Patient will complete daily toileting tasks independent with 2ww (Progressing)       Start:  05/14/24    Expected End:  06/14/24

## 2024-05-17 ENCOUNTER — DOCUMENTATION (OUTPATIENT)
Dept: RESEARCH | Age: 84
End: 2024-05-17
Payer: MEDICARE

## 2024-05-17 VITALS
RESPIRATION RATE: 18 BRPM | OXYGEN SATURATION: 97 % | WEIGHT: 269.4 LBS | TEMPERATURE: 98.1 F | HEIGHT: 69 IN | SYSTOLIC BLOOD PRESSURE: 140 MMHG | DIASTOLIC BLOOD PRESSURE: 70 MMHG | HEART RATE: 56 BPM | BODY MASS INDEX: 39.9 KG/M2

## 2024-05-17 PROCEDURE — 2500000004 HC RX 250 GENERAL PHARMACY W/ HCPCS (ALT 636 FOR OP/ED): Performed by: NURSE PRACTITIONER

## 2024-05-17 PROCEDURE — 2500000001 HC RX 250 WO HCPCS SELF ADMINISTERED DRUGS (ALT 637 FOR MEDICARE OP): Performed by: NURSE PRACTITIONER

## 2024-05-17 PROCEDURE — 97530 THERAPEUTIC ACTIVITIES: CPT | Mod: GO,CO

## 2024-05-17 PROCEDURE — G0378 HOSPITAL OBSERVATION PER HR: HCPCS

## 2024-05-17 RX ORDER — BISACODYL 10 MG/1
10 SUPPOSITORY RECTAL DAILY
Status: DISCONTINUED | OUTPATIENT
Start: 2024-05-17 | End: 2024-05-17 | Stop reason: HOSPADM

## 2024-05-17 RX ORDER — POLYETHYLENE GLYCOL 3350 17 G/17G
17 POWDER, FOR SOLUTION ORAL DAILY
Status: DISCONTINUED | OUTPATIENT
Start: 2024-05-17 | End: 2024-05-17 | Stop reason: HOSPADM

## 2024-05-17 RX ORDER — AMLODIPINE BESYLATE 10 MG/1
10 TABLET ORAL DAILY
Status: DISCONTINUED | OUTPATIENT
Start: 2024-05-17 | End: 2024-05-17 | Stop reason: HOSPADM

## 2024-05-17 RX ADMIN — POLYETHYLENE GLYCOL 3350 17 G: 17 POWDER, FOR SOLUTION ORAL at 10:06

## 2024-05-17 RX ADMIN — DOCUSATE SODIUM 100 MG: 100 CAPSULE, LIQUID FILLED ORAL at 10:06

## 2024-05-17 RX ADMIN — GABAPENTIN 300 MG: 300 CAPSULE ORAL at 10:07

## 2024-05-17 RX ADMIN — AMLODIPINE BESYLATE 10 MG: 10 TABLET ORAL at 10:06

## 2024-05-17 RX ADMIN — ASPIRIN 325 MG: 325 TABLET, COATED ORAL at 10:06

## 2024-05-17 RX ADMIN — MULTIVITAMIN TABLET 1 TABLET: TABLET at 10:06

## 2024-05-17 RX ADMIN — BISACODYL 10 MG: 10 SUPPOSITORY RECTAL at 10:07

## 2024-05-17 RX ADMIN — CEPHALEXIN 500 MG: 500 CAPSULE ORAL at 10:06

## 2024-05-17 RX ADMIN — ENOXAPARIN SODIUM 40 MG: 40 INJECTION SUBCUTANEOUS at 10:06

## 2024-05-17 ASSESSMENT — COGNITIVE AND FUNCTIONAL STATUS - GENERAL
TOILETING: A LITTLE
HELP NEEDED FOR BATHING: A LITTLE
DRESSING REGULAR LOWER BODY CLOTHING: A LITTLE
DAILY ACTIVITIY SCORE: 21

## 2024-05-17 ASSESSMENT — PAIN SCALES - GENERAL: PAINLEVEL_OUTOF10: 0 - NO PAIN

## 2024-05-17 ASSESSMENT — PAIN - FUNCTIONAL ASSESSMENT: PAIN_FUNCTIONAL_ASSESSMENT: 0-10

## 2024-05-17 NOTE — PROGRESS NOTES
Occupational Therapy    OT Treatment    Patient Name: Yanick Kruse  MRN: [id number]  Today's Date: 5/17/2024  Time Calculation  Start Time: 0941  Stop Time: 0951  Time Calculation (min): 10 min         Assessment:  OT Assessment: Collaborated with OTR/L-due to progress made throughout LOS pt would benefit from continued OT services at low intensity/frequency upon discharge to maximize safety and independence during ADL/IADLs in the least restrictive environment. Continue with current OT POC to increase strength, balance and functional tolerance to maximize potential prior to discharge.  Evaluation/Treatment Tolerance: Treatment limited secondary to agitation  End of Session Communication: Bedside nurse  End of Session Patient Position: Up in chair, Alarm off, not on at start of session (multiple family members present in room, all needs in reach)  OT Assessment Results: Decreased ADL status, Decreased endurance, Decreased functional mobility  Evaluation/Treatment Tolerance: Treatment limited secondary to agitation  Plan:  Treatment Interventions: ADL retraining, Functional transfer training, UE strengthening/ROM, Endurance training, Equipment evaluation/education, Compensatory technique education  OT Frequency: 4 times per week  OT Discharge Recommendations: Low intensity level of continued care  Equipment Recommended upon Discharge: Wheeled walker  OT Recommended Transfer Status: Assist of 1, Stand by assist  OT - OK to Discharge: Yes  Treatment Interventions: ADL retraining, Functional transfer training, UE strengthening/ROM, Endurance training, Equipment evaluation/education, Compensatory technique education    Subjective   Previous Visit Info:  OT Last Visit  OT Received On: 05/17/24  General:  General  Reason for Referral: dizziness; s/p falls; impaired ADLs  Past Medical History Relevant to Rehab: HTN, multiple back surgeries, neuropathy, carotid artery disease, osteomyelitis spine---on chronic  "antibiotics  Family/Caregiver Present: Yes  Caregiver Feedback: multiple family members present  Prior to Session Communication: Bedside nurse (CNP)  Patient Position Received: Up in chair, Alarm off, not on at start of session  General Comment: Pt cleared for OT session per CNP. Pt agitated upon arrival yelling and screaming at this OTP per severating on constipation. Education and encouragement provided on importance of activity/ rest routine. Pt impulsively stood yelling \"then lets go\" stepping towards this OTP.  Precautions:  Medical Precautions: Fall precautions  Vital Signs:     Pain:  Pain Assessment  Pain Assessment: 0-10  Pain Score: 0 - No pain  Clinical Progression: Not changed    Objective    Cognition:  Cognition  Overall Cognitive Status: Within Functional Limits  Orientation Level: Oriented X4  Safety/Judgement: Exceptions to WFL  Insight: Moderate  Impulsive: Moderately  Task Initiation: WFL  Processing Speed: Within funtional limits  Coordination:  Movements are Fluid and Coordinated: Yes  Activities of Daily Living: Grooming  Grooming Comments: pt adamantly declining all participation in ADLs.    LE Dressing  LE Dressing Comments: pt able to complete don/doff of shoes at modified independent level while seated in bedside chair. Pt dressed upon arrival.  Functional Standing Tolerance:  Time: x5min  Activity: Functional mobility with and without FWW with distant supervision  Bed Mobility/Transfers: Bed Mobility  Bed Mobility: No    Transfers  Transfer: Yes  Transfer 1  Trials/Comments 1: sit<>stands completed from standard chair height at independent level. Pt proceeded to complete short functional mobility around room to grab FWW in preparation for participation in community distance functional mobility trials. Pt with increasing agitation and further participation in OT session deferred at this time.      Functional Mobility:  Functional Mobility  Functional Mobility Performed: Yes  Functional " Mobility 1  Surface 1: Level tile  Device 1: Rolling walker  Assistance 1: Distant supervision  Comments 1: Pt able to complete community distances with distant supervision and use of FWW. Trials completed to assess safety awareness and to increase functional tolerance to maximize safety and independence during ADLs.      Outcome Measures:[facility] Daily Activity  Putting on and taking off regular lower body clothing: A little  Bathing (including washing, rinsing, drying): A little  Putting on and taking off regular upper body clothing: None  Toileting, which includes using toilet, bedpan or urinal: A little  Taking care of personal grooming such as brushing teeth: None  Eating Meals: None  Daily Activity - Total Score: 21        Education Documentation  ADL Training, taught by ROCHELLE Martinez at 5/17/2024 10:11 AM.  Learner: Family, Patient  Readiness: Refuses  Method: Explanation, Demonstration  Response: Refused Teaching    Education Comments  Education provided on role of OT/POC, safety awareness throughout functional tasks/transfers, importance of activity/ rest routine, EC/WS techniques, and use of call light for assistance. Questions, comments and concerns addressed regarding OT.         Goals:  Encounter Problems       Encounter Problems (Active)       Bathing       STG - Patient will bathe UB/LB with close supervision and AE (Progressing)       Start:  05/14/24    Expected End:  06/14/24               Dressing Upper Extremities       LTG - Patient will complete upper body dressing independent (Progressing)       Start:  05/14/24    Expected End:  06/14/24               Dressings Lower Extremities       LTG - Patient will dress lower body with close supervision and AE (Progressing)       Start:  05/14/24    Expected End:  06/14/24               Functional Mobility       Perform functional mobility to and from the bathroom independent with 2ww (Progressing)       Start:  05/14/24    Expected End:  06/14/24                Grooming       STG - Patient completes grooming independent (Progressing)       Start:  05/14/24    Expected End:  06/14/24               Therapeutic Activity       Perform BUE exercise with close supervision (Progressing)       Start:  05/14/24    Expected End:  06/14/24               Toileting       LTG - Patient will complete daily toileting tasks independent with 2ww (Progressing)       Start:  05/14/24    Expected End:  06/14/24                  Encounter Problems (Resolved)       OT Transfers       perform  all functional transfers independent with 2ww (Met)       Start:  05/14/24    Expected End:  06/14/24    Resolved:  05/17/24

## 2024-05-17 NOTE — DISCHARGE INSTRUCTIONS
Home with HC  Fall precaution  Please follow-up with PCP  Please follow-up with ENT and vascular surgery

## 2024-05-17 NOTE — CARE PLAN
The patient's goals for the shift include feel better    The clinical goals for the shift include pt will remain free from injury    Problem: Pain  Goal: My pain/discomfort is manageable  Outcome: Progressing     Problem: Safety  Goal: Patient will be injury free during hospitalization  Outcome: Progressing  Goal: I will remain free of falls  Outcome: Progressing     Problem: Daily Care  Goal: Daily care needs are met  Outcome: Progressing     Problem: Psychosocial Needs  Goal: Demonstrates ability to cope with hospitalization/illness  Outcome: Progressing  Goal: Collaborate with me, my family, and caregiver to identify my specific goals  Outcome: Progressing     Problem: Discharge Barriers  Goal: My discharge needs are met  Outcome: Progressing     Problem: Fall/Injury  Goal: Not fall by end of shift  Outcome: Progressing  Goal: Be free from injury by end of the shift  Outcome: Progressing  Goal: Verbalize understanding of personal risk factors for fall in the hospital  Outcome: Progressing  Goal: Verbalize understanding of risk factor reduction measures to prevent injury from fall in the home  Outcome: Progressing  Goal: Use assistive devices by end of the shift  Outcome: Progressing  Goal: Pace activities to prevent fatigue by end of the shift  Outcome: Progressing     Problem: Pain  Goal: Takes deep breaths with improved pain control throughout the shift  Outcome: Progressing  Goal: Turns in bed with improved pain control throughout the shift  Outcome: Progressing  Goal: Walks with improved pain control throughout the shift  Outcome: Progressing  Goal: Performs ADL's with improved pain control throughout shift  Outcome: Progressing  Goal: Participates in PT with improved pain control throughout the shift  Outcome: Progressing  Goal: Free from opioid side effects throughout the shift  Outcome: Progressing  Goal: Free from acute confusion related to pain meds throughout the shift  Outcome: Progressing

## 2024-05-17 NOTE — PROGRESS NOTES
Yanick Kruse is a 83 y.o. male on day 4 of admission presenting with Dizziness.      Subjective   Patient seen and examined. Awake/alert/oriented. Denies dizziness or headache at this time. Denies chest pain, shortness of breath, fevers, chills, nausea, or vomiting. No abdominal discomfort but reports constipation.         Objective     Last Recorded Vitals  /64 (BP Location: Left arm, Patient Position: Lying)   Pulse 81   Temp 36.5 °C (97.7 °F) (Oral)   Resp 17   Wt 122 kg (269 lb 6.4 oz)   SpO2 100%   Intake/Output last 3 Shifts:  No intake or output data in the 24 hours ending 05/17/24 1107      Admission Weight  Weight: 122 kg (270 lb) (05/13/24 0833)    Daily Weight  05/15/24 : 122 kg (269 lb 6.4 oz)    Image Results  Transthoracic Echo (TTE) [place] [address]             Phone [phone]    TRANSTHORACIC ECHOCARDIOGRAM REPORT       Patient Name:      YANICK KRUSE     Reading Physician:    [id number] Wilver Cotton MD  Study Date:        5/14/2024             Ordering Provider:    [id number] SHANNAN KAUFMAN  MRN/PID:           [id number]              Fellow:  Accession#:        [id number]          Nurse:  Date of Birth/Age: 1940 / 83 years  Sonographer:          Lenny Fontenot RDCS  Gender:            M                     Additional Staff:  Height:            174.00 cm             Admit Date:  Weight:            126.00 kg             Admission Status:     Inpatient -                                                                 Routine  BSA / BMI:         2.36 m2 / 41.62 kg/m2 Department Location:  [facility]  Blood Pressure: 151 /71 mmHg    Study Type:    TRANSTHORACIC ECHO (TTE) COMPLETE  Diagnosis/ICD: 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  Pertinent History: CVA, Chest Pain, HTN, Hyperlipidemia, Murmur and CAD.                     Dizziness.    Study Detail: The following Echo studies were performed: 2D, M-Mode, Doppler and                color flow. Technically challenging study due to poor acoustic                windows and body habitus. Definity used as a contrast agent for                endocardial border definition. Total contrast used for this                procedure was 1 mL via IV push.       PHYSICIAN INTERPRETATION:  Left Ventricle: Left ventricular systolic function is normal, with an estimated ejection fraction of 60-65%. There are no regional wall motion abnormalities. The left ventricular cavity size is normal. Spectral Doppler shows an impaired relaxation pattern of left ventricular diastolic filling.  Left Atrium: The left atrium is normal in size. A bubble study using agitated saline was very technically difficult and unable to be interpreted.  Right Ventricle: The right ventricle is normal in size. There is normal right ventricular global systolic function.  Right Atrium: The right atrium is normal in size.  Aortic Valve: The aortic valve is trileaflet. There is no evidence of aortic valve regurgitation. The peak instantaneous gradient of the aortic valve is 15.6 mmHg. The mean gradient of the aortic valve is 8.5 mmHg.  Mitral Valve: The mitral valve is normal in structure. There is no evidence of mitral valve regurgitation.  Tricuspid Valve: The tricuspid valve is structurally normal. There is physiological tricuspid regurgitation.  Pulmonic Valve: The pulmonic valve is not well visualized. The pulmonic valve regurgitation was not well visualized.  Pericardium: There is no pericardial effusion noted.  Aorta: The aortic  root is normal.       CONCLUSIONS:   1. Left ventricular systolic function is normal with a 60-65% estimated ejection fraction.   2. Spectral Doppler shows an impaired relaxation pattern of left ventricular diastolic filling.   3. No evidence of mitral valve regurgitation.   4. Physiological tricuspid regurgitation.    QUANTITATIVE DATA SUMMARY:  2D MEASUREMENTS:                           Normal Ranges:  Ao Root s:     3.08 cm  IVSd:          1.28 cm   (0.6-1.1cm)  LVPWd:         1.23 cm   (0.6-1.1cm)  LVIDd:         3.66 cm   (3.9-5.9cm)  LVIDs:         2.57 cm  LV Mass Index: 65.9 g/m2  LV % FS        29.9 %    LA VOLUME:                              Normal Ranges:  LA Volume Index: 38.0 ml/m2  LA Vol A4C:      71.7 ml  LA Vol A2C:      101.4 ml    RA VOLUME BY A/L METHOD:                        Normal Ranges:  RA Area A4C: 17.2 cm2    LV SYSTOLIC FUNCTION BY 2D PLANIMETRY (MOD):                      Normal Ranges:  EF-A4C View: 70.9 % (>=55%)  EF-A2C View: 69.6 %  EF-Biplane:  70.5 %    LV DIASTOLIC FUNCTION:                         Normal Ranges:  MV Peak E:    0.53 m/s (0.7-1.2 m/s)  MV Peak A:    0.90 m/s (0.42-0.7 m/s)  E/A Ratio:    0.59     (1.0-2.2)  MV e'         0.10 m/s (>8.0)  MV lateral e' 0.10 m/s  MV medial e'  0.09 m/s  E/e' Ratio:   5.58     (<8.0)    MITRAL VALVE:                  Normal Ranges:  MV DT: 297 msec (150-240msec)    AORTIC VALVE:                                     Normal Ranges:  AoV Vmax:                1.97 m/s  (<=1.7m/s)  AoV Peak PG:             15.6 mmHg (<20mmHg)  AoV Mean P.5 mmHg  (1.7-11.5mmHg)  LVOT Max Francis:            1.30 m/s  (<=1.1m/s)  AoV VTI:                 44.60 cm  (18-25cm)  LVOT VTI:                30.52 cm  LVOT Diameter:           2.01 cm   (1.8-2.4cm)  AoV Area, VTI:           2.16 cm2  (2.5-5.5cm2)  AoV Area,Vmax:           2.08 cm2  (2.5-4.5cm2)  AoV Dimensionless Index: 0.68       RIGHT VENTRICLE:  RV Basal 4.10 cm  RV Mid   3.00 cm  RV  Major 6.4 cm    PULMONIC VALVE:                       Normal Ranges:  PV Max Francis: 1.3 m/s  (0.6-0.9m/s)  PV Max P.4 mmHg       [id number] Wilver Cotton MD  Electronically signed on 2024 at 12:18:31 PM       ** Final **      Physical Exam  Vitals reviewed.   Constitutional:       Appearance: Normal appearance.   HENT:      Head: Normocephalic and atraumatic.   Eyes:      Extraocular Movements: Extraocular movements intact.      Conjunctiva/sclera: Conjunctivae normal.   Cardiovascular:      Rate and Rhythm: Normal rate and regular rhythm.   Pulmonary:      Effort: Pulmonary effort is normal.      Breath sounds: Normal breath sounds. No wheezing, rhonchi or rales.   Abdominal:      General: Bowel sounds are normal.      Palpations: Abdomen is soft.      Tenderness: There is no abdominal tenderness.   Skin:     General: Skin is warm and dry.   Neurological:      Mental Status: He is alert and oriented to person, place, and time.      Sensory: Sensory deficit present.      Motor: Weakness present.         Relevant Results  Lab Results   Component Value Date    GLUCOSE 112 (H) 05/15/2024    CALCIUM 9.3 05/15/2024     05/15/2024    K 3.9 05/15/2024    CO2 23 (L) 05/15/2024     05/15/2024    BUN 19 05/15/2024    CREATININE 1.10 05/15/2024     Lab Results   Component Value Date    WBC 7.7 05/15/2024    HGB 13.0 (L) 05/15/2024    HCT 39.5 (L) 05/15/2024    MCV 98 05/15/2024     05/15/2024     Vascular US Carotid Artery Duplex Bilateral  Result Date: 2024  Preliminary Cardiology Report   Critical Result: There is greater than 70% stenosis of the right internal caroitd artery. Notification called to SHARLA Hicks on 2024 at 11:29:20 AM by Renetta Brown RVT.  PRELIMINARY CONCLUSIONS:  Right Carotid: Findings are consistent with greater than 70% stenosis of the right proximal internal carotid artery. Laminar flow seen by color Doppler. Right external carotid artery appears patent  with no evidence of stenosis. The right vertebral artery is patent with antegrade flow. No evidence of hemodynamically significant stenosis in the right subclavian artery. Left Carotid: Findings are consistent with 50 to 69% stenosis of the left proximal internal carotid artery. Left external carotid artery appears patent with no evidence of stenosis. The left vertebral artery is patent with antegrade flow. No evidence of hemodynamically significant stenosis in the left subclavian artery. Additional Findings: Technically difficult exam due to patient's positioning and deep inspiration.  Imaging & Doppler Findings: Right Plaque Morph: The proximal right internal carotid artery demonstrates heterogenous and calcified plaque. The proximal right external carotid artery demonstrates heterogenous plaque. The mid right common carotid artery demonstrates heterogenous plaque. The distal right common carotid artery demonstrates heterogenous and calcified plaque. Left Plaque Morph: The proximal left internal carotid artery demonstrates heterogenous and calcified plaque. The proximal left external carotid artery demonstrates heterogenous and calcified plaque. The distal left common carotid artery demonstrates heterogenous and calcified plaque.       MR angio neck wo IV contrast  Result Date: 5/13/2024  MRI BRAIN: 1. No acute infarct, acute hemorrhage, or intracranial mass effect. 2. Slight progression of mild supratentorial chronic ischemic white matter changes.   MRA HEAD AND NECK: 1. No evidence of major vessel occlusion or significant stenosis on MRA head. 2. There is a 60-65% stenosis of the left carotid bulb. There is a 75-80% stenosis of the right carotid bulb.   MACRO: None.   Signed by: Attila Raines 5/13/2024 11:08 PM Dictation workstation:   [id number]    XR arthrogram sacroiliac joints  Result Date: 5/13/2024  IMPRESSION: Fusion of the lumbar spine extending the bilateral iliac wing. Fusion of the upper  ligamentous aspect of the sacroiliac joints. : ANTONINA   Transcribe Date/Time: May 13 2024 11:52A Dictated by : STEVEN ROSS MD  This examination was interpreted and the report reviewed and electronically signed by: STEVEN ROSS MD on May 13 2024 12:06PM  EST    CT head wo IV contrast  Result Date: 5/13/2024  Unremarkable exam.   Signed by: Forest Carin 5/13/2024 9:06 AM Dictation workstation:   [id number]         Assessment/Plan      Principal Problem:    Dizziness      Right carotid artery stenosis  Seen on MRA  US carotid showed greater than 70% stenosis  Continue ASA  Vascular surgery following, appreciate recs  Duplex reviewed by Dr. Portillo, less than 70% , ok for follow up in three months with repeat duplex-clear for discharge    Dizziness rule out CVA  Reported dizziness, lightheadedness, headache- now resolved  Has chronic neuropathy, decreased strength and sensation left hand  ASA/statin  lipid panel reviewed  MRI/MRA results as above  Echocardiogram pending results  consult neurology, appreciate recommendations-recommend outpatient follow up with neurology  ENT-cleared for discharge, need OP follow up with ENT     Hypertension  Resume antihypertensives  PRN apresoline     Hyperlipidemia  Statin     History of osteomyelitis of lumbar spine  Follows with Dr. Arriaga outpatient  Continue chronic Keflex     Chronic back pain  Resume home medications    Constipation  MiraLAX and suppository ordered     Plan  Stroke workup negative  Cleared by all consultants for discharge  Need referral to ENT outpatient, will see if care coordinator can arrange  DVT prophylaxis: Lovenox  PT/OT- SNF- pending placement     CODE STATUS:  Patient is a full code.    Temi Telles, APRN-CNP

## 2024-05-17 NOTE — DISCHARGE SUMMARY
"Discharge Diagnosis  Dizziness    Issues Requiring Follow-Up  Home with   Fall precaution  Please follow-up with PCP  Please follow-up with ENT and vascular surgery    Discharge Meds     Your medication list        CONTINUE taking these medications        Instructions Last Dose Given Next Dose Due   amlodipine-olmesartan 10-40 mg tablet  Commonly known as: Alycia           aspirin 325 mg tablet           azelastine 0.05 % ophthalmic solution  Commonly known as: Optivar           cephalexin 500 mg capsule  Commonly known as: Keflex           docusate sodium 100 mg capsule  Commonly known as: Colace           fluticasone 50 mcg/actuation nasal spray  Commonly known as: Flonase           gabapentin 300 mg capsule  Commonly known as: Neurontin           gabapentin 300 mg capsule  Commonly known as: Neurontin           ketoconazole 2 % cream  Commonly known as: NIZOral           multivitamin tablet           oxyCODONE-acetaminophen 5-325 mg tablet  Commonly known as: Percocet           rosuvastatin 20 mg tablet  Commonly known as: Crestor           SAW PALMETTO ORAL                    Test Results Pending At Discharge  Pending Labs       No current pending labs.            Hospital Course   Yanick Kruse is a 83 y.o. male with a past medical history of hypertension, osteomyelitis of the lumbar spine, on chronic antibiotics, follows with Dr. Arriaga, hyperlipidemia, stenosis of right carotid artery, and chronic back pain status post spine surgeries who presented to the emergency department with complaints of dizziness.  Patient states that he has been having headaches for the last several months that he had been attributing to his sinuses.  States that he had also been having lightheadedness and dizziness as well however not as severe.  He was treated with a Z-Clark for a sinus infection several weeks ago.  States that he did have an episode of \"passing out\" resulting in a fall on April 12.  He does have chronic " "neuropathy however states that he noticed several weeks ago that his left hand has been weaker and that he has been dropping things.  Patient reported to the nurse in the ED that he felt as if he was going to \"pass out\" yesterday.  He reported feeling like the room had been spinning.  Reported that this has been going on for several months however was significantly worse today.  Denies chest pain, shortness of breath, fevers, nausea, or vomiting.  Does report chills.  No abdominal discomfort.     In the ED: No leukocytosis.  H&H 13.1/39.6.  Platelets 232.  Sodium 141, potassium 4.3.  BUN/creatinine 23/1.0 which appears to be his baseline.  UA was unremarkable.  CT of the head showed no acute intracranial process.  He was hypertensive with a systolic in the 170s.  Troponin mildly elevated at 21, repeat 23.  He was given meclizine, Zofran, and IV fluids in the ED.  Admitted to [facility] for further evaluation and treatment.    Patient was seen and evaluated by neurology.  Acute stroke rule out.  ENT has seen evaluated patient and recommending outpatient follow-up for further testing.  Patient was found to have carotid stenosis which was evaluated by vascular surgery.  No intervention recommended at this time but will need close outpatient follow-up.  Patient was seen and evaluated by physical therapy, recommended rehab placement however insurance declined.  Peer to peer was completed and  continue to decline SNF placement but agreed to daily physical therapy and Occupational Therapy at home.  This was communicated to social work.  Patient will be discharged home with home care.    Pertinent Physical Exam At Time of Discharge  Physical Exam  Vitals reviewed.   HENT:      Head: Normocephalic.      Nose: Nose normal.      Mouth/Throat:      Mouth: Mucous membranes are moist.   Eyes:      Extraocular Movements: Extraocular movements intact.   Cardiovascular:      Rate and Rhythm: Regular rhythm. "   Pulmonary:      Effort: Pulmonary effort is normal.   Abdominal:      General: Bowel sounds are normal.   Musculoskeletal:         General: Normal range of motion.      Cervical back: Neck supple.   Skin:     General: Skin is warm.   Neurological:      Mental Status: He is alert and oriented to person, place, and time.         Outpatient Follow-Up  Future Appointments   Date Time Provider Department Center   5/28/2024  2:30 PM Cristobal Canas MD [id number]   9/6/2024  9:45 AM Cristobal Canas MD [address]         Temi Telles, APRN-CNP

## 2024-05-17 NOTE — NURSING NOTE
Updated wife Mayela Hendricks ([phone])- wife requesting to speak with care coordinator first thing in AM regarding patient's discharge time / status

## 2024-05-17 NOTE — PROGRESS NOTES
05/17/24 1009   Discharge Planning   Patient expects to be discharged to: [facility]     Precert pending for [facility].  Precert escalated this morning by Direct Submit Team. 7000 is in progress.     DC PLAN IS NOT SECURE. PRECERT PENDING

## 2024-05-28 ENCOUNTER — APPOINTMENT (OUTPATIENT)
Dept: OTOLARYNGOLOGY | Facility: CLINIC | Age: 84
End: 2024-05-28
Payer: MEDICARE

## 2024-05-29 NOTE — DOCUMENTATION CLARIFICATION NOTE
"    PATIENT:               ESTEBAN RODRIGUEZ  ACCT #:                  [id number]  MRN:                       [id number]  :                       1940  ADMIT DATE:       2024 8:24 AM  DISCH DATE:        2024 5:54 PM  RESPONDING PROVIDER #:        [id number]          PROVIDER RESPONSE TEXT:    dizziness related to underlying sinus disease    CDI QUERY TEXT:    Clarification        Instruction:    Based on your assessment of the patient and the clinical information, please provide the requested documentation by clicking on the appropriate radio button and enter any additional information if prompted.    Question: Please clarify if a relationship exists between    When answering this query, please exercise your independent professional judgment. The fact that a question is being asked, does not imply that any particular answer is desired or expected.    The patient's clinical indicators include:  Clinical Information:  84 yo male who states that he has been treated for sinus issues since 2024.  Around the same time, he developed mild symptoms of dizziness and lightheadedness.  On Saturday, May 11, 2024, the patient had an acute worsening of his lightheadedness/dizziness symptoms.    Clinical Indicators:  ED BP:  172/87  HGB: 13.1  CT Head:  \"There is no mass effect, hemorrhage, or infarct.The ventricles are  normal.The visualized paranasal sinuses are clear.\"     MR Head/Neck:  \"1.No evidence of major vessel occlusion or significant stenosis on  MRA head.  2.There is a 60-65% stenosis of the left carotid bulb.There is a  75-80% stenosis of the right carotid bulb.\"     Neurology Consult:  \"He was treated with a Z-Clark for sinus infection several weeks ago but continues to have symptoms.\"  \" Chronic sinus disease with chronic sinus headache\"  \"  Dizziness with lightheaded sensation and no vertigo: Suspect this is most likely due to underlying sinus disease and possibly hypertension given elevated " "blood pressure on presentation.\"    5/16 Medicine PN:  \"Right carotid artery stenosis  Seen on MRA  US carotid showed greater than 70% stenosis\"  \"Stroke workup negative  Cleared by all consultants for discharge  Need referral to ENT outpatient, will see if care coordinator can arrange\"      Treatment:  MRI/MRA; CT Head; Neuro Consult; Vascular Surgery Consult; ASA; TTE; ENT Consult:  IV Fluids; Zofran; Meclizine; PT/OT; SNF; Pharmacy med review    Risk Factors:  HTN; Age; HLD; CAD; Recent Sinus Infection; Carotid Stenosis  Options provided:  -- Dizziness related to Chronic Sinus Infection  -- Dizziness related to Carotid Artery Stenosis  -- Other - I will add my own diagnosis  -- Refer to Clinical Documentation Reviewer    Query created by: Odalis Ochoa on 5/29/2024 1:18 PM      Electronically signed by:  SHANNAN FITCH-CNP 5/29/2024 1:44 PM          "

## 2024-06-05 ENCOUNTER — TELEPHONE (OUTPATIENT)
Dept: INPATIENT UNIT | Facility: HOSPITAL | Age: 84
End: 2024-06-05
Payer: MEDICARE

## 2024-06-25 LAB
ATRIAL RATE: 78 BPM
P AXIS: 56 DEGREES
P OFFSET: 172 MS
P ONSET: 115 MS
PR INTERVAL: 206 MS
Q ONSET: 218 MS
QRS COUNT: 13 BEATS
QRS DURATION: 110 MS
QT INTERVAL: 430 MS
QTC CALCULATION(BAZETT): 490 MS
QTC FREDERICIA: 469 MS
R AXIS: 76 DEGREES
T AXIS: 13 DEGREES
T OFFSET: 433 MS
VENTRICULAR RATE: 78 BPM

## 2024-09-06 ENCOUNTER — APPOINTMENT (OUTPATIENT)
Dept: OTOLARYNGOLOGY | Facility: CLINIC | Age: 84
End: 2024-09-06
Payer: MEDICARE